# Patient Record
Sex: MALE | Race: AMERICAN INDIAN OR ALASKA NATIVE
[De-identification: names, ages, dates, MRNs, and addresses within clinical notes are randomized per-mention and may not be internally consistent; named-entity substitution may affect disease eponyms.]

---

## 2018-05-24 ENCOUNTER — HOSPITAL ENCOUNTER (EMERGENCY)
Dept: HOSPITAL 42 - ED | Age: 48
LOS: 1 days | Discharge: HOME | End: 2018-05-25
Payer: MEDICAID

## 2018-05-24 VITALS — RESPIRATION RATE: 18 BRPM

## 2018-05-24 VITALS — TEMPERATURE: 98.3 F

## 2018-05-24 DIAGNOSIS — F19.10: Primary | ICD-10-CM

## 2018-05-24 LAB
ALBUMIN SERPL-MCNC: 4.6 G/DL (ref 3–4.8)
ALBUMIN/GLOB SERPL: 1.3 {RATIO} (ref 1.1–1.8)
ALT SERPL-CCNC: 38 U/L (ref 7–56)
APAP SERPL-MCNC: < 10 UG/ML (ref 10–20)
APPEARANCE UR: CLEAR
AST SERPL-CCNC: 34 U/L (ref 17–59)
BASOPHILS # BLD AUTO: 0.05 K/MM3 (ref 0–2)
BASOPHILS NFR BLD: 0.7 % (ref 0–3)
BILIRUB UR-MCNC: NEGATIVE MG/DL
BUN SERPL-MCNC: 16 MG/DL (ref 7–21)
CALCIUM SERPL-MCNC: 9.5 MG/DL (ref 8.4–10.5)
COLOR UR: YELLOW
EOSINOPHIL # BLD: 0.1 10*3/UL (ref 0–0.7)
EOSINOPHIL NFR BLD: 1.7 % (ref 1.5–5)
ERYTHROCYTE [DISTWIDTH] IN BLOOD BY AUTOMATED COUNT: 13.3 % (ref 11.5–14.5)
GFR NON-AFRICAN AMERICAN: 54
GLUCOSE UR STRIP-MCNC: NEGATIVE MG/DL
GRANULOCYTES # BLD: 4.76 10*3/UL (ref 1.4–6.5)
GRANULOCYTES NFR BLD: 66.4 % (ref 50–68)
HGB BLD-MCNC: 12.6 G/DL (ref 14–18)
LEUKOCYTE ESTERASE UR-ACNC: NEGATIVE LEU/UL
LYMPHOCYTES # BLD: 1.6 10*3/UL (ref 1.2–3.4)
LYMPHOCYTES NFR BLD AUTO: 22.5 % (ref 22–35)
MCH RBC QN AUTO: 31.2 PG (ref 25–35)
MCHC RBC AUTO-ENTMCNC: 34 G/DL (ref 31–37)
MCV RBC AUTO: 91.8 FL (ref 80–105)
MONOCYTES # BLD AUTO: 0.6 10*3/UL (ref 0.1–0.6)
MONOCYTES NFR BLD: 8.7 % (ref 1–6)
PH UR STRIP: 6 [PH] (ref 4.7–8)
PLATELET # BLD: 319 10^3/UL (ref 120–450)
PMV BLD AUTO: 10.6 FL (ref 7–11)
PROT UR STRIP-MCNC: NEGATIVE MG/DL
RBC # BLD AUTO: 4.04 10^6/UL (ref 3.5–6.1)
RBC # UR STRIP: NEGATIVE /UL
SALICYLATES SERPL-MCNC: < 1 MG/DL (ref 2–20)
SP GR UR STRIP: <= 1.005 (ref 1–1.03)
UROBILINOGEN UR STRIP-ACNC: 0.2 E.U./DL
WBC # BLD AUTO: 7.2 10^3/UL (ref 4.5–11)

## 2018-05-24 NOTE — ED PDOC
Arrival/HPI





- General


Chief Complaint: Psychiatric Evaluation


Time Seen by Provider: 05/24/18 11:26


EM Caveat: Psychotic (history is diffucult to obtain secondary to flood of ideas

)





- History of Present Illness


Narrative History of Present Illness (Text): 


05/24/18 12:13


47 year old male brought in by EMS presents to the Emergency department because 

patient was acting strange in public, as per triage note. Patient admits to 

occasional heroin use. History is difficult to obtain secondary to patient's 

flood of ideas. Patient states he was with his parents but he is unable to 

provide a history for events prior to being picked up by EMS. Patient denies 

suicidal ideation, homicidal ideation, and hallucinations. Patient denies any 

fever, chills, chest pain, shortness of breath, nausea, vomiting, diarrhea, 

urinary symptoms, back pain, neck pain, headache, dizziness, trauma/injury, or 

any other complaints.


Time/Duration: Prior to Arrival


Symptom Onset: Sudden


Symptom Course: Unchanged


Activities at Onset: Light


Context: Walking





Past Medical History





- Provider Review


Nursing Documentation Reviewed: Yes





- Infectious Disease


Hx of Infectious Diseases: None





- Psychiatric


Hx Substance Use: No (unknown)





Family/Social History





- Physician Review


Nursing Documentation Reviewed: Yes


Family/Social History: Unknown Family HX


Smoking Status: Unknown If Ever Smoked


Hx Alcohol Use: No (unknown)


Hx Substance Use: No (unknown)





Allergies/Home Meds


Allergies/Adverse Reactions: 


Allergies





No Known Allergies Allergy (Unverified 05/24/18 12:02)


 








Home Medications: 


 Home Meds











 Medication  Instructions  Recorded  Confirmed


 


No Known Home Med  05/24/18 05/24/18














Review of Systems





- Review of Systems


Constitutional: absent: Fevers, Night Sweats


Respiratory: absent: SOB


Cardiovascular: absent: Chest Pain


Gastrointestinal: absent: Diarrhea, Nausea, Vomiting


Genitourinary Male: absent: Dysuria


Musculoskeletal: absent: Back Pain, Neck Pain


Neurological: absent: Headache, Dizziness


Psychiatric: absent: Suicidal Ideation





Physical Exam


Vital Signs Reviewed: Yes


Vital Signs











  Temp Pulse Resp BP Pulse Ox


 


 05/24/18 19:04   88  18  114/63  98


 


 05/24/18 12:13  98.3 F  92 H  17  141/89  99














- Systems Exam


Head: Present: Atraumatic, Normocephalic


Pupils: Present: PERRL


Extroacular Muscles: Present: EOMI


Conjunctiva: Present: Normal


Mouth: Present: Moist Mucous Membranes


Neck: Present: Normal Range of Motion


Respiratory/Chest: Present: Clear to Auscultation, Good Air Exchange.  No: 

Respiratory Distress, Accessory Muscle Use


Cardiovascular: Present: Regular Rate and Rhythm, Normal S1, S2.  No: Murmurs


Abdomen: No: Tenderness, Distention, Peritoneal Signs


Back: Present: Normal Inspection


Upper Extremity: Present: Normal Inspection.  No: Cyanosis, Edema


Lower Extremity: Present: Other (excoriations noted on lower extremities)


Neurological: Present: GCS=15, CN II-XII Intact, Speech Normal


Skin: Present: Warm, Dry, Normal Color.  No: Rashes


Psychiatric: Present: Alert, Oriented x 3, Other (flood of ideas, tangential 

thoughts)





Medical Decision Making


ED Course and Treatment: 


05/24/18 12:28


Impression:


47 year old male brought in to the Emergency department by EMS because he was 

"acting strange in public."





Plan:


-- EKG


-- Urinalysis


-- Labs


-- [facility] Crisis Evaluation


-- Reassess and disposition





Progress Notes:


05/24/18 13:06


Discussed case in detail with patient's PMD, Dr. Paulo Rocha. Patient has a 

history of behavioral issues, possible bipolar disorder. it is unknown if the 

patient is currently on any medications. Patient has been medically cleared for 

psychiatric evaluation. 





- Lab Interpretations


Lab Results: 








 05/24/18 12:15 





 05/24/18 12:15 





 Lab Results





05/24/18 12:45: Urine Opiates Screen Positive H, Urine Methadone Screen Negative

, Ur Barbiturates Screen Negative, Ur Phencyclidine Scrn Negative, Ur 

Amphetamines Screen Negative, U Benzodiazepines Scrn Negative, U Oth Cocaine 

Metabols Negative, U Cannabinoids Screen Negative


05/24/18 12:45: Urine Color Yellow, Urine Appearance Clear, Urine pH 6.0, Ur 

Specific Gravity <= 1.005, Urine Protein Negative, Urine Glucose (UA) Negative, 

Urine Ketones Negative, Urine Blood Negative, Urine Nitrate Negative, Urine 

Bilirubin Negative, Urine Urobilinogen 0.2, Ur Leukocyte Esterase Negative


05/24/18 12:15: Alcohol, Quantitative < 10


05/24/18 12:15: Salicylates < 1 L, Acetaminophen < 10.0 L


05/24/18 12:15: Sodium 141, Potassium 4.4, Chloride 102, Carbon Dioxide 28, 

Anion Gap 16, BUN 16, Creatinine 1.4, Est GFR (African Amer) > 60, Est GFR (Non-

Af Amer) 54, Random Glucose 106, Calcium 9.5, Total Bilirubin 0.4, AST 34, ALT 

38, Alkaline Phosphatase 45, Total Protein 8.1, Albumin 4.6, Globulin 3.6, 

Albumin/Globulin Ratio 1.3


05/24/18 12:15: WBC 7.2, RBC 4.04, Hgb 12.6 L, Hct 37.1 L, MCV 91.8, MCH 31.2, 

MCHC 34.0, RDW 13.3, Plt Count 319, MPV 10.6, Gran % 66.4, Lymph % (Auto) 22.5, 

Mono % (Auto) 8.7 H, Eos % (Auto) 1.7, Baso % (Auto) 0.7, Gran # 4.76, Lymph # (

Auto) 1.6, Mono # (Auto) 0.6, Eos # (Auto) 0.1, Baso # (Auto) 0.05











- RAD Interpretation


Narrative RAD Interpretations (Text): 


05/24/2018 12:22:51


PROCEDURE:  CHEST RADIOGRAPH, 1 VIEW


FINDINGS:


LUNGS:


Clear.


PLEURA:


No pneumothorax or pleural fluid seen.


CARDIOVASCULAR:


Normal.


OSSEOUS STRUCTURES:


No significant abnormalities.


VISUALIZED UPPER ABDOMEN:


Normal.


OTHER FINDINGS:


None. 


IMPRESSION:


No active disease.





Radiology Orders: 








05/24/18 12:02


CHEST ONE VIEW [RAD] Stat 














- EKG Interpretation


EKG Interpretation (Text): 


05/24/18 11:50


EKG:


Ordered, reviewed, and independently interpreted the EKG.


Rate : 68 BPM


Rhythm : NSR


Interpretation : No ST-segment elevations or depressions, no T-wave inversions, 

normal intervals.


Interpreted by ED Physician: Yes


Type: 12 lead EKG





- Medication Orders


Current Medication Orders: 











Discontinued Medications





Famotidine (Pepcid)  20 mg PO STAT STA


   Stop: 05/24/18 22:23


   Last Admin: 05/24/18 22:34  Dose: 20 mg





Lorazepam (Ativan)  2 mg IM STAT STA


   PRN Reason: Protocol


   Stop: 05/24/18 13:23


   Last Admin: 05/24/18 19:55 Dose:  Not Given


   Non-Admin Reason: Patient Refused





Nicotine (Nicoderm Cq)  1 patch TD STAT STA


   Stop: 05/24/18 19:30


   Last Admin: 05/24/18 19:30 Dose:  Not Given


   Non-Admin Reason: Patient Refused











- Transfer of Care


Patient signed out to Dr:: Patel


Other: [facility] screening





- Scribe Statement


The provider has reviewed the documentation as recorded by the Scribe


Xavier Vizcarra





All medical record entries made by the Scribe were at my direction and 

personally dictated by me. I have reviewed the chart and agree that the record 

accurately reflects my personal performance of the history, physical exam, 

medical decision making, and the department course for this patient. I have 

also personally directed, reviewed, and agree with the discharge instructions 

and disposition.





Disposition/Present on Arrival





- Present on Arrival


Any Indicators Present on Arrival: No


History of DVT/PE: No


History of Uncontrolled Diabetes: No


Urinary Catheter: No


History of Decub. Ulcer: No


History Surgical Site Infection Following: None





- Disposition


Have Diagnosis and Disposition been Completed?: No


Diagnosis: 


 Drug abuse





Disposition Time: 23:00


Condition: UNKNOWN


Forms:  [facility] (English)

## 2018-05-24 NOTE — ED PDOC
Physical Exam


Vital Signs Reviewed: Yes


Vital Signs











  Temp Pulse Resp BP Pulse Ox


 


 05/24/18 19:04   88  18  114/63  98


 


 05/24/18 12:13  98.3 F  92 H  17  141/89  99











Temperature: Afebrile


Blood Pressure: Normal


Pulse: Tachycardic


Respiratory Rate: Normal


Appearance: Positive for: Well-Appearing, Non-Toxic, Comfortable


Pain Distress: None


Mental Status: Positive for: Alert and Oriented X 3





Medical Decision Making


ED Course and Treatment: 





05/24/18 23:28: Case endorsed to me by Dr. Adkins. Patient pending [facility] [place] 

screening, reassessment, and disposition. 





05/25/18 01:34: Patient evaluated by [facility] screener. Patient is stable to be 

discharged home. 





- Lab Interpretations


Lab Results: 








 05/24/18 12:15 





 05/24/18 12:15 





 Lab Results





05/24/18 12:45: Urine Opiates Screen Positive H, Urine Methadone Screen Negative

, Ur Barbiturates Screen Negative, Ur Phencyclidine Scrn Negative, Ur 

Amphetamines Screen Negative, U Benzodiazepines Scrn Negative, U Oth Cocaine 

Metabols Negative, U Cannabinoids Screen Negative


05/24/18 12:45: Urine Color Yellow, Urine Appearance Clear, Urine pH 6.0, Ur 

Specific Gravity <= 1.005, Urine Protein Negative, Urine Glucose (UA) Negative, 

Urine Ketones Negative, Urine Blood Negative, Urine Nitrate Negative, Urine 

Bilirubin Negative, Urine Urobilinogen 0.2, Ur Leukocyte Esterase Negative


05/24/18 12:15: Alcohol, Quantitative < 10


05/24/18 12:15: Salicylates < 1 L, Acetaminophen < 10.0 L


05/24/18 12:15: Sodium 141, Potassium 4.4, Chloride 102, Carbon Dioxide 28, 

Anion Gap 16, BUN 16, Creatinine 1.4, Est GFR (African Amer) > 60, Est GFR (Non-

Af Amer) 54, Random Glucose 106, Calcium 9.5, Total Bilirubin 0.4, AST 34, ALT 

38, Alkaline Phosphatase 45, Total Protein 8.1, Albumin 4.6, Globulin 3.6, 

Albumin/Globulin Ratio 1.3


05/24/18 12:15: WBC 7.2, RBC 4.04, Hgb 12.6 L, Hct 37.1 L, MCV 91.8, MCH 31.2, 

MCHC 34.0, RDW 13.3, Plt Count 319, MPV 10.6, Gran % 66.4, Lymph % (Auto) 22.5, 

Mono % (Auto) 8.7 H, Eos % (Auto) 1.7, Baso % (Auto) 0.7, Gran # 4.76, Lymph # (

Auto) 1.6, Mono # (Auto) 0.6, Eos # (Auto) 0.1, Baso # (Auto) 0.05











- RAD Interpretation


Radiology Orders: 








05/24/18 12:02


CHEST ONE VIEW [RAD] Stat 














- Medication Orders


Current Medication Orders: 











Discontinued Medications





Famotidine (Pepcid)  20 mg PO STAT STA


   Stop: 05/24/18 22:23


   Last Admin: 05/24/18 22:34  Dose: 20 mg





Lorazepam (Ativan)  2 mg IM STAT STA


   PRN Reason: Protocol


   Stop: 05/24/18 13:23


   Last Admin: 05/24/18 19:55 Dose:  Not Given


   Non-Admin Reason: Patient Refused





Nicotine (Nicoderm Cq)  1 patch TD STAT STA


   Stop: 05/24/18 19:30


   Last Admin: 05/24/18 19:30 Dose:  Not Given


   Non-Admin Reason: Patient Refused











- Scribe Statement


The provider has reviewed the documentation as recorded by the Scribe


Zoë Partida 





Provider Scribe Attestation:


All medical record entries made by the Scribe were at my direction and 

personally dictated by me. I have reviewed the chart and agree that the record 

accurately reflects my personal performance of the history, physical exam, 

medical decision making, and the department course for this patient. I have 

also personally directed, reviewed, and agree with the discharge instructions 

and disposition.








Disposition/Present on Arrival





- Present on Arrival


Any Indicators Present on Arrival: No


History of DVT/PE: No


History of Uncontrolled Diabetes: No


Urinary Catheter: No


History of Decub. Ulcer: No


History Surgical Site Infection Following: None





- Disposition


Have Diagnosis and Disposition been Completed?: Yes


Diagnosis: 


 Drug abuse





Disposition: HOME/ ROUTINE


Disposition Time: 01:25


Condition: GOOD


Discharge Instructions (ExitCare):  Drug Abuse and Drug Addiction (DC)


Forms:  [facility] (English)

## 2018-05-24 NOTE — RAD
PROCEDURE:  CHEST RADIOGRAPH, 1 VIEW



HISTORY:

r/o infiltrate



COMPARISON:

None available.



FINDINGS:



LUNGS:

Clear.



PLEURA:

No pneumothorax or pleural fluid seen.



CARDIOVASCULAR:

Normal.



OSSEOUS STRUCTURES:

No significant abnormalities.



VISUALIZED UPPER ABDOMEN:

Normal.



OTHER FINDINGS:

None. 



IMPRESSION:

No active disease.

## 2018-05-24 NOTE — CARD
--------------- APPROVED REPORT --------------





EKG Measurement

Heart [id number]

PA [id number]

[id number]

[id number]

[id number]



<Conclusion>

Normal sinus rhythm

Possible Left atrial enlargement

Borderline ECG

## 2018-05-25 VITALS — DIASTOLIC BLOOD PRESSURE: 80 MMHG | OXYGEN SATURATION: 100 % | HEART RATE: 92 BPM | SYSTOLIC BLOOD PRESSURE: 130 MMHG

## 2018-07-17 ENCOUNTER — HOSPITAL ENCOUNTER (INPATIENT)
Dept: HOSPITAL 42 - ED | Age: 48
LOS: 2 days | Discharge: LEFT BEFORE DISCHARGE | DRG: 426 | End: 2018-07-19
Attending: PSYCHIATRY & NEUROLOGY | Admitting: PSYCHIATRY & NEUROLOGY
Payer: MEDICAID

## 2018-07-17 VITALS — OXYGEN SATURATION: 97 %

## 2018-07-17 VITALS — BODY MASS INDEX: 24.3 KG/M2

## 2018-07-17 DIAGNOSIS — F17.210: ICD-10-CM

## 2018-07-17 DIAGNOSIS — Z82.3: ICD-10-CM

## 2018-07-17 DIAGNOSIS — K21.9: ICD-10-CM

## 2018-07-17 DIAGNOSIS — B00.9: ICD-10-CM

## 2018-07-17 DIAGNOSIS — F32.9: Primary | ICD-10-CM

## 2018-07-17 DIAGNOSIS — R45.851: ICD-10-CM

## 2018-07-17 DIAGNOSIS — F11.10: ICD-10-CM

## 2018-07-17 NOTE — ED PDOC
Arrival/HPI





- General


Chief Complaint: Psychiatric Evaluation


Time Seen by Provider: 07/17/18 21:59


Historian: Patient





- History of Present Illness


Narrative History of Present Illness (Text): 





07/17/18 22:25


28-year-old male presents as psychiatric transfer for admission to behavioral 

health floor for depression. Patient states he was feeling depressed. Denies 

chest pain or shortness of breath. Denies abdominal pain. No nausea or vomiting.





Past Medical History





- Provider Review


Nursing Documentation Reviewed: Yes





- Travel History


Have you recently traveled outside [place] w/in the past 3 mons?: No





- Infectious Disease


Hx of Infectious Diseases: None





- Cardiac


Hx Cardiac Disorders: No


Hx Hypertension: No





- Pulmonary


Hx Tuberculosis: No





- Neurological


HX Cerebrovascular Accident: No


Hx Seizures: No





- Hematological/Oncological


Hx Cancer: No





- Gastrointestinal


Hx Gastroesophageal Reflux: Yes





- Genitourinary/Gynecological


Hx Sexually Transmitted Diseases: Yes (herpes)





- Psychiatric


Hx Depression: Yes


Hx Substance Use: Yes (heroin abuse)





- Anesthesia


Hx Anesthesia: No





Family/Social History





- Physician Review


Nursing Documentation Reviewed: Yes


Family/Social History: Unknown Family HX


Smoking Status: Unknown If Ever Smoked


Hx Alcohol Use: No (unknown)


Hx Substance Use: Yes (heroin abuse)





Allergies/Home Meds


Allergies/Adverse Reactions: 


Allergies





lactose Allergy (Verified 07/17/18 22:03)


 DIARRHEA








Home Medications: 


 Home Meds











 Medication  Instructions  Recorded  Confirmed


 


No Known Home Med  05/24/18 05/24/18














Review of Systems





- Review of Systems


Constitutional: absent: Fatigue, Fevers


Respiratory: absent: SOB, Cough


Cardiovascular: absent: Chest Pain, Palpitations


Gastrointestinal: absent: Abdominal Pain, Nausea, Vomiting


Genitourinary Male: absent: Dysuria


Musculoskeletal: absent: Arthralgias, Back Pain, Neck Pain


Neurological: absent: Headache, Dizziness


Endocrine: absent: Diaphoresis


Psychiatric: Depression.  absent: Anxiety





Physical Exam


Vital Signs Reviewed: Yes





Vital Signs











  Temp Pulse Resp BP Pulse Ox


 


 07/17/18 22:23  100.3 F H  64  18  150/90  97











Temperature: Afebrile


Blood Pressure: Normal


Pulse: Regular


Respiratory Rate: Normal


Appearance: Positive for: Well-Appearing, Non-Toxic, Comfortable


Pain Distress: None


Mental Status: Positive for: Alert and Oriented X 3





- Systems Exam


Head: Present: Atraumatic


Mouth: Present: Moist Mucous Membranes


Respiratory/Chest: Present: Clear to Auscultation


Cardiovascular: Present: Regular Rate and Rhythm


Abdomen: No: Tenderness





Medical Decision Making


ED Course and Treatment: 





07/17/18 22:26


48yr old male with depression. transferred to [facility] for admission for depression. 








pt was medically cleared prior to arrival. 





cbc; wbc; 4.7  hbg; 10.7


cmp; wnl


 uds; + cocaine and opiates














impression; depression


admit to behavioral health floor














Disposition/Present on Arrival





- Present on Arrival


Any Indicators Present on Arrival: No


History of DVT/PE: No


History of Uncontrolled Diabetes: No


Urinary Catheter: No


History of Decub. Ulcer: No


History Surgical Site Infection Following: None





- Disposition


Have Diagnosis and Disposition been Completed?: Yes


Diagnosis: 


 Depression





Disposition: HOSPITALIZED


Disposition Time: 22:27


Patient Plan: Admission


Condition: FAIR

## 2018-07-18 VITALS — RESPIRATION RATE: 20 BRPM

## 2018-07-18 LAB
HDLC SERPL-MCNC: 53 MG/DL (ref 29–60)
HEPATITIS A IGM: NEGATIVE
HEPATITIS B CORE AB: NEGATIVE
HEPATITIS C ANTIBODY: NEGATIVE
LDLC SERPL-MCNC: 82 MG/DL (ref 0–129)

## 2018-07-18 PROCEDURE — GZ3ZZZZ MEDICATION MANAGEMENT: ICD-10-PCS | Performed by: PSYCHIATRY & NEUROLOGY

## 2018-07-18 NOTE — RAD
Date of service: 



07/18/2018



HISTORY:

R/O pneumonia  



COMPARISON:

05/24/2018



TECHNIQUE:

Chest PA and lateral



FINDINGS:



LUNGS:

No active pulmonary disease.



PLEURA:

No significant pleural effusion identified. No pneumothorax apparent.



CARDIOVASCULAR:

Normal.



OSSEOUS STRUCTURES:

No significant abnormalities.



VISUALIZED UPPER ABDOMEN:

Normal.



OTHER FINDINGS:

None.



IMPRESSION:

No active disease.

## 2018-07-18 NOTE — PCM.PSYCH
<Stephanie Ross - Last Filed: 07/18/18 14:38>





Initial Psychiatric Evaluation





- Initial Psychiatric Evaluation


Type of Admission: Voluntary


Legal Status: Capacity


Chief Complaint (in patient's own words): 





I just keep getting frustrated with everything shahnaz on at home with my dad. I 

dont really care about myself. Im here to get better for them.





Patient's Reaction to Hospitalization: 





Patient was admitted to the psych unit for evaluation and stabilization after 

making suicidal statements to a friend.





History of Present Illness and Precipitating Events: 





Jerome Mendoza is a 49 yo  male with a hx of depression and 

heroin use who was sent as a transfer from [facility] [place] making [place] statements. 

The patient lives with his parents, who he takes care of. He reports becoming 

increasingly frustrated with his father because he as many needs after having a 

stroke last year. The patient expressed suicidal ideation to his friend who 

suggested he get help. The patient then brought himself to [facility] ED. According 

to medical records, in the ED, he endorsed SI and depression. He also admitted 

to snorting heroin 2 days prior. He states he brought himself to the hospital 

instead of attempting suicide because he cares about his parents and wants to 

get better for them.





The patient reports being depressed for awhile. He admits to one suicide 

attempt a long time ago by OD on pills. He reports being hospitalized for 

weeks after this attempt. He endorses low mood, poor sleep and appetite, and 

anhedonia. He appears to be future-oriented regarding caring for his parents; 

however, when asked what his future plans are, he replies, I cant even see 

tomorrow right now. Patient endorses irritability but denies other symptoms of 

ashley, including lack of need for sleep, grandiosity, pressured speech. He 

endorses anxiety regarding hi home life, but denies panic attacks or obsessions 

or compulsions. He denies paranoia, delusions, and hallucinations. He endorses 

heroin use intranasally. He denies alcohol or other drugs, including IVDA. 

Patient denies history of trauma or abuse.





Patient was previously hospitalized at [facility]. He also has been in rehab 

programs. The patient denies taking any psych meds now and does not see an 

outpatient psychiatrist. He states that the only time he took psych meds was 

while he was in [facility]. According to medical records, he was seen by PES at [facility] 

in April for bizarre behavior and heroin use- he was discharged from the ED. He 

was then seen at [facility] ED in 5/18 after being brought in by police fro bizarre 

behavior/agitation. Collateral was obtained from his mother at that time, and, 

according to PES worker, she stated that he makes suicidal statements when 

using drugs. 





The patient was seen this morning in tx team. He was dressed in casual clothing 

with acceptable hygiene. He was cooperative and calm with fair eye contact. He 

denied AVH, paranoia. He denied HI. Patient denied SI with a plan at this time 

and was able to contract for safety.  





Past psych hx:


[facility] inpatient


IOP 2014


Rehab for opioid abuse





PMH:


Active herpes infection- genital/anal, ?hands


?cardiomegaly





FH:


Father- CVA


Mother- poor physical health


No siblings or children


Denies h/o mental illness or suicide attempts





SH:


Lives with parents


Never , no children


Heroin intranasal- amount/frequency varies, last used 5 days ago


10 cigarettes/day


Denies alcohol use


Denies other illicit drug use, including IVDA


           


Current Medications: 





Active Medications











Generic Name Dose Route Start Last Admin





  Trade Name Freq  PRN Reason Stop Dose Admin


 


Acetaminophen  650 mg  07/17/18 22:55  





  Tylenol 325mg Tab  PO   





  Q4H PRN   





  Pain, Mild (1-3)   


 


Al Hydrox/Mg Hydrox/Simethicone  30 ml  07/17/18 22:55  





  Maalox Plus 30 Ml  PO   





  DAILY PRN   





  Upset Stomach   


 


Fluoxetine HCl  20 mg  07/18/18 08:00  07/18/18 08:01





  Prozac  PO   20 mg





  DAILY KUSUM   Administration


 


Hydroxyzine Pamoate  50 mg  07/18/18 00:58  





  Vistaril  PO   





  Q6 PRN   





  Anxiety   





  Protocol   


 


Lorazepam  2 mg  07/18/18 00:56  





  Ativan  PO   





  Q6H PRN   





  Agitation   





  Protocol   


 


Lorazepam  2 mg  07/18/18 00:57  





  Ativan  IM   





  Q6H PRN   





  Agitation   





  Protocol   


 


Magnesium Hydroxide  30 ml  07/17/18 22:55  





  Milk Of Magnesia  PO   





  DAILY PRN   





  Constipation   


 


Mirtazapine  15 mg  07/18/18 22:00  





  Remeron  PO   





  HS KUSUM   


 


Ondansetron HCl  4 mg  07/18/18 00:58  





  Zofran Odt  PO   





  Q8H PRN   





  Nausea/Vomiting   


 


Tramadol HCl  50 mg  07/18/18 00:58  





  Ultram  PO   





  Q8H PRN   





  Pain, moderate (4-7)   


 


Ziprasidone  20 mg  07/18/18 00:56  





  Geodon Cap  PO   





  Q6H PRN   





  Agitation   





  Protocol   


 


Ziprasidone  20 mg  07/18/18 00:57  





  Geodon Inj  IM   





  Q6H PRN   





  Agitation   





  Protocol   














Past Psychiatric History





- Past Psychiatric History


Pertinent Medical Hx (Current Medical&Sleep Prob, Allergies): 





 Allergies











Allergy/AdvReac Type Severity Reaction Status Date / Time


 


lactose Allergy  DIARRHEA Verified 07/18/18 01:07








 





Acyclovir/Hydrocortisone [Xerese 5%-1%] 1 cre TP BID 07/18/18 











<Theresa Sorenson - Last Filed: 07/18/18 15:18>





Initial Psychiatric Evaluation





- Initial Psychiatric Evaluation


Legal Status: Capacity (patient has capacity to sign consent for treatment)


History of Present Illness and Precipitating Events: 


patient was seen at the treatment team meeting, interviewed, discussed with 

staff


Notes reviewed and agreed with assessment and plan


Patient has acceptable personal hygiene, good ADLs.





Patient presented to be depressed, flat affect, very slow responses, yes no 

answers, patient Was making hopeless statements.





in regards of the past psychiatric history, patient reported that he overdosed 

on pills at the age of 19 and was in [facility] psychiatric 

inpatient unit for a couple of weeks, patient does not remember what 

medications he was taking, does not rememberif medications make him feel better.





patient was seen by infectious disease, patient is NOT required to be on 

contact isolation





Patient reported that he snorts opioids,last time was last Friday, patient 

reports smokes about 10 cigarettes a day, counseling provided, nicotine patch 

was offered, patient declined that offer


Current Medications: 





Active Medications











Generic Name Dose Route Start Last Admin





  Trade Name Freq  PRN Reason Stop Dose Admin


 


Acetaminophen  650 mg  07/17/18 22:55  07/18/18 14:16





  Tylenol 325mg Tab  PO   650 mg





  Q4H PRN   Administration





  Pain, Mild (1-3)   


 


Al Hydrox/Mg Hydrox/Simethicone  30 ml  07/17/18 22:55  





  Maalox Plus 30 Ml  PO   





  DAILY PRN   





  Upset Stomach   


 


Fluoxetine HCl  20 mg  07/18/18 08:00  07/18/18 08:01





  Prozac  PO   20 mg





  DAILY KUSUM   Administration


 


Hydroxyzine Pamoate  50 mg  07/18/18 00:58  





  Vistaril  PO   





  Q6 PRN   





  Anxiety   





  Protocol   


 


Lorazepam  2 mg  07/18/18 00:56  





  Ativan  PO   





  Q6H PRN   





  Agitation   





  Protocol   


 


Lorazepam  2 mg  07/18/18 00:57  





  Ativan  IM   





  Q6H PRN   





  Agitation   





  Protocol   


 


Magnesium Hydroxide  30 ml  07/17/18 22:55  





  Milk Of Magnesia  PO   





  DAILY PRN   





  Constipation   


 


Mirtazapine  15 mg  07/18/18 22:00  





  Remeron  PO   





  HS KUSUM   


 


Ondansetron HCl  4 mg  07/18/18 00:58  





  Zofran Odt  PO   





  Q8H PRN   





  Nausea/Vomiting   


 


Tramadol HCl  50 mg  07/18/18 00:58  





  Ultram  PO   





  Q8H PRN   





  Pain, moderate (4-7)   


 


Ziprasidone  20 mg  07/18/18 00:56  





  Geodon Cap  PO   





  Q6H PRN   





  Agitation   





  Protocol   


 


Ziprasidone  20 mg  07/18/18 00:57  





  Geodon Inj  IM   





  Q6H PRN   





  Agitation   





  Protocol   














Past Psychiatric History





- Past Psychiatric History


Previous Treatment History: Inpatient


Prior Professional Help: See HPI


Prior Psychiatric Treatment: see HPI


At what hospital: see HPI


Duration: see HPI


Nature of Treatment: see HPI


Explanation of prior treatment: 





see HPI


History of Abuse: 





see HPI


History of ETOH/Drug Use: 





see HPI


History of Family Illness: 





see HPI


Pertinent Medical Hx (Current Medical&Sleep Prob, Allergies): 





 Allergies











Allergy/AdvReac Type Severity Reaction Status Date / Time


 


lactose Allergy  DIARRHEA Verified 07/18/18 01:07








 





Acyclovir/Hydrocortisone [Xerese 5%-1%] 1 cre TP BID 07/18/18 











Review of Systems





- Review of Systems


Systems not reviewed;Unavailable: Acuity of Condition





- EENT


Eyes: As Per HPI


Ears: As Per HPI


Nose/Mouth/Throat: As Per HPI





- Cardiovascular


Cardiovascular: As Per HPI





- Respiratory


Respiratory: As Per HPI





- Gastrointestinal


Gastrointestinal: As Per HPI





- Genitourinary


Genitourinary: As Per HPI





- Reproductive: Male


Reproductive:Male: As Per HPI





- Musculoskeletal


Musculoskeletal: As Par HPI





- Integumentary


Integumentary: As Per HPI





- Neurological


Neurological: As Per HPI





- Psychiatric


Psychiatric: As Per HPI





- Endocrine


Endocrine: As Per HPI





- Hematologic/Lymphatic


Hematologic: As Per HPI





Mental Status Examination





- Personal Presentation


Personal Presentation: Looks older than stated age





- Affect


Affect: Flat





- Motor Activity


Motor Activity: Psychomotor Retardation





- Reliability in Providing Information


Reliability in Providing Information: Fair





- Speech


Speech: Organized





- Mood


Mood: Depressed





- Formal Thought Process


Formal Thought Process: No Impairment, Paranoia





- Hallucinations/Delusions


Delusions: Persecution





- Obsessions/Compulsions


Obsessions: None


Compulsions: None





- Cognitive Functions


Orientation: Person, Place, Situation


Sensorium: Alert


Abstract Thinking: [place]


Estimate of Intelligence: Below average


Judgement: Intact, as evidence by: Insight regarding need for hospitalization





- Risk


Risk: Diminished functioning





- Strength & Assets Inventory


Strength & Assets Inventory: Cooperative





- Limitations


Limitations: Other (severe and as of the symptoms)





DSM 5 DX





- DSM 5


DSM 5 Diagnosis: 





rule out major depressive disorder


Rule out opioid abuse


Rule out substance-induced mood disorder





- Recommended/Plan of Treatment


Treatment Recommendations and Plan of Treatment: 


Milieu/structure/supportive therapy 


Medical consult appreciated, see medical team note for more detailed info 


SW consultation for discharge plan and social issues 


Med management 


Prozac 20 mg daily for depression and anxiety


Remeron 15 mg at the nighttime for insomnia


Symptomatic treatment for possible withdrawal symptoms but patient reported 

that he started opioids last Friday at present moment patient denied any 

withdrawal symptoms


Family involvement 


Follow up on labs 


Will monitor closely 


Pt was educated about risk/benefits and alternatives of medications, coping 

strategies (safety plan, suicide prevention), relapse prevention, importance of 

follow up with psychiatrist and therapist, stay away from drugs/alcohol/smoking





Projected ELOS: 7 days


Prognosis: guarded


Discharge Plan and Discharge Criteria: 


Pt will be not depressed or manic, will be more hopeful, will be not psychotic 

or anxious, will be not having thoughts of harming self or others, will be 

tolerating medications well, will not have major side effects, will be able to 

function, will not pose threat to self or others.





- Smoking Cessation


Smoking Cessation Initiated: No


Reason for not providing: refused

## 2018-07-18 NOTE — CP.PCM.CON
<Pablo Mcgrath - Last Filed: 07/18/18 15:01>





History of Present Illness





- History of Present Illness


History of Present Illness: 





Reason for consult: Open wound on left foot





Mr. Mendoza is a 47 yo male with a PMH of herpes on valcyclovir and doxycycline 

presents as psychiatric transfer from [facility] for admission to behavioral health 

for depression and suicidal ideation. Medicine was consulted to evaluate an 

open wound on his left foot. Patient states that he has had the wound for 3 

weeks. It started off as a blister and it became worse overtime. He states that 

the pain is worse when he is walking and putting pressure on the wound. Patient 

otherwise denies fever, chills, headaches, nausea, vomiting, chest pain, SOB, 

or abdominal pain.





12 point review of systems negative.





Past medical history: Herpes 


Medications: Acyclovir and doxycycline, not on any other home medications


Surgical history: denies


Allergies: lactose intolerance


Social history: Smoker- 20 pack years, Heroin- snort, last use was this week, 

denies ealcohol use


Family history: Father had a history of CVA, mother had brain cancer.





Review of Systems





- Review of Systems


Review of Systems: 





12 point review of systems negative.





Past Patient History





- Infectious Disease


Hx of Infectious Diseases: None





- Past Social History


Smoking Status: Unknown If Ever Smoked





- CARDIAC


Hx Cardiac Disorders: No


Hx Hypertension: No





- PULMONARY


Hx Tuberculosis: No





- NEUROLOGICAL


HX Cerebrovascular Accident: No


Hx Seizures: No





- HEENT


Hx HEENT Problems: No





- RENAL


Hx Chronic Kidney Disease: No





- ENDOCRINE/METABOLIC


Hx Endocrine Disorders: No





- HEMATOLOGICAL/ONCOLOGICAL


Hx Cancer: No





- INTEGUMENTARY


Hx Dermatological Problems: Yes


Other/Comment: recent Herpes outbreak





- MUSCULOSKELETAL/RHEUMATOLOGICAL


Hx Musculoskeletal Disorders: No





- GASTROINTESTINAL


Hx Gastroesophageal Reflux: Yes





- GENITOURINARY/GYNECOLOGICAL


Hx Sexually Transmitted Disorders: Yes (herpes)





- PSYCHIATRIC


Hx Depression: Yes


Hx Substance Use: Yes (heroin abuse)





- SURGICAL HISTORY


Hx Surgeries: No





- ANESTHESIA


Hx Anesthesia: No





Meds


Allergies/Adverse Reactions: 


 Allergies











Allergy/AdvReac Type Severity Reaction Status Date / Time


 


lactose Allergy  DIARRHEA Verified 07/18/18 01:07














- Medications


Medications: 


 Current Medications





Acetaminophen (Tylenol 325mg Tab)  650 mg PO Q4H PRN


   PRN Reason: Pain, Mild (1-3)


Al Hydrox/Mg Hydrox/Simethicone (Maalox Plus 30 Ml)  30 ml PO DAILY PRN


   PRN Reason: Upset Stomach


Fluoxetine HCl (Prozac)  20 mg PO DAILY [facility]


   Last Admin: 07/18/18 08:01 Dose:  20 mg


Hydroxyzine Pamoate (Vistaril)  50 mg PO Q6 PRN; Protocol


   PRN Reason: Anxiety


Lorazepam (Ativan)  2 mg PO Q6H PRN; Protocol


   PRN Reason: Agitation


Lorazepam (Ativan)  2 mg IM Q6H PRN; Protocol


   PRN Reason: Agitation


Magnesium Hydroxide (Milk Of Magnesia)  30 ml PO DAILY PRN


   PRN Reason: Constipation


Mirtazapine (Remeron)  15 mg PO HS KUSUM


Ondansetron HCl (Zofran Odt)  4 mg PO Q8H PRN


   PRN Reason: Nausea/Vomiting


Tramadol HCl (Ultram)  50 mg PO Q8H PRN


   PRN Reason: Pain, moderate (4-7)


Ziprasidone (Geodon Cap)  20 mg PO Q6H PRN; Protocol


   PRN Reason: Agitation


Ziprasidone (Geodon Inj)  20 mg IM Q6H PRN; Protocol


   PRN Reason: Agitation











Physical Exam





- Head Exam


Head Exam: ATRAUMATIC, NORMAL INSPECTION





- Eye Exam


Eye Exam: Normal appearance





- ENT Exam


ENT Exam: Mucous Membranes Moist





- Cardiovascular Exam


Cardiovascular Exam: REGULAR RHYTHM.  absent: Gallop, Rubs, +S1, +S2, Systolic 

Murmur





- GI/Abdominal Exam


GI & Abdominal Exam: Normal Bowel Sounds, Soft.  absent: Tenderness





- Extremities Exam


Extremities exam: Negative for: calf tenderness, pedal edema





- Neurological Exam


Neurological exam: Alert, Normal Gait, Oriented x3





- Psychiatric Exam


Psychiatric exam: Flat Affect





- Skin


Skin Exam: Dry, Normal Color, Warm


Additional comments: 





Open sore that looks superficial on the plantar side below the first metatarsal 

measuring 3-4 cm. No active bleeding and not purulent.


Dark, flat, macular lesions found along legs and arms.





Results





- Vital Signs


Recent Vital Signs: 


 Last Vital Signs











Temp  98.2 F   07/18/18 07:00


 


Pulse  55 L  07/18/18 07:00


 


Resp  20   07/18/18 07:00


 


BP  128/90   07/18/18 07:00


 


Pulse Ox  97   07/17/18 22:53














- Labs


Labs: 


 Laboratory Results - last 24 hr











  07/18/18 07/18/18 07/18/18





  06:39 07:30 07:30


 


Fasting Glucose   88 


 


Triglycerides   72 


 


Cholesterol   170 


 


LDL Cholesterol Direct   82 


 


HDL Cholesterol   53 


 


Procalcitonin  < 0.05 L  


 


TSH 3rd Generation    1.60














Assessment & Plan





- Assessment and Plan (Free Text)


Assessment: 





Mr. Mendoza is a 47 yo male with PMH of herpes that is admitted to behavioral 

unit for depression and suicidal ideation. Medicine was consulted to evaluate 

an open wound on the plantar surface of the left foot below the 1st metatarsal.


Plan: 








Open wound on left foot


- need to rule out infectious etiologies vs diabetic ulcer


- ID consulted, will monitor without antibiotics for now


- procalcitonin <0.05


- wound culture pending 


- Hemoglobin a1c pending


- HIV test pending


- Hepatitis panel pending


- UA pending





Suicidal Ideation and depression


- Patient is currently placed on voluntary hold in-patient psych 


- Management per psych








Patient case reviewed with and plan approved by attending physician, Dr. Tabares.


Thank you for allowing me to participate in the care of the above named 

patient. 





<Lynnette Tabares R - Last Filed: 07/18/18 16:53>





Meds





- Medications


Medications: 


 Current Medications





Acetaminophen (Tylenol 325mg Tab)  650 mg PO Q4H PRN


   PRN Reason: Pain, Mild (1-3)


   Last Admin: 07/18/18 14:16 Dose:  650 mg


Al Hydrox/Mg Hydrox/Simethicone (Maalox Plus 30 Ml)  30 ml PO DAILY PRN


   PRN Reason: Upset Stomach


Fluoxetine HCl (Prozac)  20 mg PO DAILY KUSUM


   Last Admin: 07/18/18 08:01 Dose:  20 mg


Hydroxyzine Pamoate (Vistaril)  50 mg PO Q6 PRN; Protocol


   PRN Reason: Anxiety


Lorazepam (Ativan)  2 mg PO Q6H PRN; Protocol


   PRN Reason: Agitation


Lorazepam (Ativan)  2 mg IM Q6H PRN; Protocol


   PRN Reason: Agitation


Magnesium Hydroxide (Milk Of Magnesia)  30 ml PO DAILY PRN


   PRN Reason: Constipation


Mirtazapine (Remeron)  15 mg PO HS KUSUM


Ondansetron HCl (Zofran Odt)  4 mg PO Q8H PRN


   PRN Reason: Nausea/Vomiting


Tramadol HCl (Ultram)  50 mg PO Q8H PRN


   PRN Reason: Pain, moderate (4-7)


Ziprasidone (Geodon Cap)  20 mg PO Q6H PRN; Protocol


   PRN Reason: Agitation


Ziprasidone (Geodon Inj)  20 mg IM Q6H PRN; Protocol


   PRN Reason: Agitation











Results





- Vital Signs


Recent Vital Signs: 


 Last Vital Signs











Temp  98.2 F   07/18/18 07:00


 


Pulse  55 L  07/18/18 07:00


 


Resp  20   07/18/18 07:00


 


BP  128/90   07/18/18 07:00


 


Pulse Ox  97   07/17/18 22:53














- Labs


Labs: 


 Laboratory Results - last 24 hr











  07/18/18 07/18/18 07/18/18





  06:39 07:30 07:30


 


Fasting Glucose   88 


 


Triglycerides   72 


 


Cholesterol   170 


 


LDL Cholesterol Direct   82 


 


HDL Cholesterol   53 


 


Procalcitonin  < 0.05 L  


 


TSH 3rd Generation    1.60














Attending/Attestation





- Attestation


I have personally seen and examined this patient.: Yes


I have fully participated in the care of the patient.: Yes


I have reviewed all pertinent clinical information: Yes


Notes (Text): 


Patient seen and examined by me at 12:30PM with resident.  Case including HPI, 

physical exam, and physical assessment and plan discussed with resident. Agree 

with above with following additions/corrections.


Patient is a 48-year-old male with past medical history significant for herpes 

that was admitted to psychiatric unit for making suicidal comments to his 

friend. We are consulted for low-grade fever and small open wound on plantar 

surface of left foot.


Patient states that the open wound on the plantar side of his left foot has 

been there for approximately 3 weeks. He states that he gets wounds like this 

often. States that they started off like a blister and they may turn into 

wounds and take a long time to heal. Patient has not tried anything for this 

wound. Patient was transfered from [facility]. Patient states his wound was wrapped 

at [facility]. Patient states that he did see his primary care doctor as an 

outpatient for this. Patient states it is painful and is worse with walking. No 

radiation of the pain.  Patient denies any fevers. However patient was found to 

have low grade temp of 100.3. No chest pain or shortness of breath. No nausea, 

vomiting, or abdominal pain. No headaches or dizziness. No dysuria. No neck 

pain or back pain.


14 point review of systems reviewed by me. See above HPI. All other review of 

systems negative.


Physical exam:


Gen: Awake and alert sitting up in bed in no acute distress


HEENT: Normocephalic atraumatic. Extraocular muscles intact, pupils equal 

reactive. No scleral icterus. Oropharynx is pink and moist, no pharyngeal 

erythema or exudate appreciated. Neck is supple. Hearing grossly intact. Ears 

and nose externally unremarkable.


Cardiovascular: Normal rhythm, normal S1-S2. No murmurs, rubs, or gallops 

appreciated 


Pulmonary: Normal respiratory effort. No rhonchi, rales or wheezing 

appreciated. 


Gastrointestinal: Soft, nontender, nondistended, positive bowel sounds all 4 

quadrants, no guarding


Musculoskeletal: Normal range of motion all extremities, no calf tenderness, no 

CVA tenderness


Central nervous system: AAO 3. Cranial nerves 2 through 12 grossly intact. 5 

out of 5 muscle strength all extremities. Sensation intact.


Dermatologic: Skin warm and dry. Positive healed dark scar around wounds on 

Lasix and arms. Positive proximately 2 cm superficial open wound on plantar 

surface, no drainage from the area, no erythema, no signs of infection


Assessment and plan: Patient is a 48-year-old male with past medical history 

significant for herpes that was admitted to psychiatric unit for making 

suicidal comments to his friend. We are consulted for low-grade fever and small 

open wound on plantar surface of left foot.


1. Left foot wound. Low-grade temperature. ID following, recommendations noted. 

Patient to be observed off antibiotics for now. Rule out other sources of 

infection. Rule out UTI. Follow up urinalysis, urine culture, and labs. Chest x-

ray with no active disease


2. Suicidal ideation and depression. Care as per primary team.


3. History of herpes. No active outbreak currently.


Case was discussed in detail with the patient, psychiatrist, and medical 

resident regarding current diagnosis and treatment plan.


Thank you for allowing us to participate in the care of your patient. We will 

follow with you.

## 2018-07-18 NOTE — PCM.BM
Treatment Plan Problems





- Problems identified on initial assessmt


  ** SUICIDAL IDEATION


Date Initiated: 07/17/18


Time Initiated: 22:00


Assessment reference: NA


Status: Active


Priority: 1





  ** INEFFECTIVE COPING


Date Initiated: 07/17/18


Time Initiated: 22:00


Assessment reference: NA


Status: Active


Priority: 2





  ** HOPELESSNESS/HELPLESSNESS


Date Initiated: 07/17/18


Time Initiated: 22:00


Assessment reference: NA


Status: Active


Priority: 3





  ** ALTERED SLEEPING PATTERN


Date Initiated: 07/17/18


Time Initiated: 22:00


Assessment reference: NA


Priority: 4





Treatment assets and liabiliti


Patient Assests: adapts well, cooperative, educated, insightful, self-reliant, 

ADL independent, physically healthy, negotiates basic needs, cognitively intact


Patient Liabilities: physical pain, financial problems, substance abuse





- Milieu Protocol


Maintain good personal hygiene: every other day Encourage regular showers, 

every shift Remind patient to perform daily oral care, every shift Assist 

patient to perform ADL's


Maintain personal safety: every shift Educate patient to report safety concerns 

to staff, every shift Monitor environment for contraband/sharps


Medication safety: Monitor for expected outcome, potential side effects: every 

shift, Assess barriers to learning: every shift, Assess readiness for 

medication education: every shift





Family Contact


Family involvement: Family/SO is involved


Family contact: Patient agrees to contact





Discharge/Continuing Care





- Education Needs


Education Needs: Patient Medication, Patient Diagnosis/Disease Process, Patient 

Coping Skills, Patient Pain





- Discharge


Discharge Criteria: Tolerates medication w/o severe side effects, Free of 

Suicidal thoughts, Free of agitation, Normal sleep pattern

## 2018-07-18 NOTE — CP.PCM.CON
History of Present Illness





- History of Present Illness


History of Present Illness: 


48 year old male with PMH of GERD, history of HSV infection, history of heroin 

abuse came in to [facility] and is now admitted in the Psych unit for depression. The 

patient was noted to have low grade temperatures on admission and Infectious 

Diseases consult is requested to further evaluate and manage.The patient 

currently dose not have fever or chills, no cough or rhinorrhea, no sore throat

, no abdominal pain, no dysuria, no penile discharge, no abdominal pain, no 

diarrhea, no SOB.





Review of Systems





- Review of Systems


All systems: reviewed and no additional remarkable complaints except (as per HPI

)





Past Patient History





- Infectious Disease


Hx of Infectious Diseases: None





- Past Social History


Smoking Status: Unknown If Ever Smoked





- CARDIAC


Hx Cardiac Disorders: No


Hx Hypertension: No





- PULMONARY


Hx Tuberculosis: No





- NEUROLOGICAL


HX Cerebrovascular Accident: No


Hx Seizures: No





- HEENT


Hx HEENT Problems: No





- RENAL


Hx Chronic Kidney Disease: No





- ENDOCRINE/METABOLIC


Hx Endocrine Disorders: No





- HEMATOLOGICAL/ONCOLOGICAL


Hx Cancer: No





- INTEGUMENTARY


Hx Dermatological Problems: Yes


Other/Comment: recent Herpes outbreak





- MUSCULOSKELETAL/RHEUMATOLOGICAL


Hx Musculoskeletal Disorders: No





- GASTROINTESTINAL


Hx Gastroesophageal Reflux: Yes





- GENITOURINARY/GYNECOLOGICAL


Hx Sexually Transmitted Disorders: Yes (herpes)





- PSYCHIATRIC


Hx Depression: Yes


Hx Substance Use: Yes (heroin abuse)





- SURGICAL HISTORY


Hx Surgeries: No





- ANESTHESIA


Hx Anesthesia: No





Meds


Allergies/Adverse Reactions: 


 Allergies











Allergy/AdvReac Type Severity Reaction Status Date / Time


 


lactose Allergy  DIARRHEA Verified 07/18/18 01:07














- Medications


Medications: 


 Current Medications





Acetaminophen (Tylenol 325mg Tab)  650 mg PO Q4H PRN


   PRN Reason: Pain, Mild (1-3)


Al Hydrox/Mg Hydrox/Simethicone (Maalox Plus 30 Ml)  30 ml PO DAILY PRN


   PRN Reason: Upset Stomach


Fluoxetine HCl (Prozac)  20 mg PO DAILY KUSUM


Hydroxyzine Pamoate (Vistaril)  50 mg PO Q6 PRN; Protocol


   PRN Reason: Anxiety


Lorazepam (Ativan)  2 mg PO Q6H PRN; Protocol


   PRN Reason: Agitation


Lorazepam (Ativan)  2 mg IM Q6H PRN; Protocol


   PRN Reason: Agitation


Magnesium Hydroxide (Milk Of Magnesia)  30 ml PO DAILY PRN


   PRN Reason: Constipation


Ondansetron HCl (Zofran Odt)  4 mg PO Q8H PRN


   PRN Reason: Nausea/Vomiting


Tramadol HCl (Ultram)  50 mg PO Q8H PRN


   PRN Reason: Pain, moderate (4-7)


Ziprasidone (Geodon Cap)  20 mg PO Q6H PRN; Protocol


   PRN Reason: Agitation


Ziprasidone (Geodon Inj)  20 mg IM Q6H PRN; Protocol


   PRN Reason: Agitation











Physical Exam





- Constitutional


Appears: Chronically Ill





- Head Exam


Head Exam: NORMAL INSPECTION





- Respiratory Exam


Respiratory Exam: Decreased Breath Sounds





- Cardiovascular Exam


Cardiovascular Exam: +S1, +S2





- GI/Abdominal Exam


GI & Abdominal Exam: Soft.  absent: Tenderness





Results





- Vital Signs


Recent Vital Signs: 


 Last Vital Signs











Temp  100.3 F H  07/17/18 22:53


 


Pulse  64   07/17/18 22:53


 


Resp  18   07/17/18 22:53


 


BP  150/90   07/17/18 22:53


 


Pulse Ox  97   07/17/18 22:53














Assessment & Plan





- Assessment and Plan (Free Text)


Plan: 





Assessment


Low grade fever R/O UTI, R/O upper respiratory infection


depression


GERD


history of HSV infection


history of heroin abuse





Plan


will monitor off antibiotics - follow up blood cx, urine cx, urinalysis, CBC, 

PCT, CXR


discussed with Dr. Cardenas

## 2018-07-19 VITALS — HEART RATE: 57 BPM | DIASTOLIC BLOOD PRESSURE: 92 MMHG | TEMPERATURE: 97.8 F | SYSTOLIC BLOOD PRESSURE: 124 MMHG

## 2018-07-19 NOTE — CP.PCM.PN
Subjective





- Date & Time of Evaluation


Date of Evaluation: 07/19/18


Time of Evaluation: 11:30





- Subjective


Subjective: 





Comfortable, no fevers, no dysuria, no nausea, no abdominal pain, no cough.





Objective





- Vital Signs/Intake and Output


Vital Signs (last 24 hours): 


 











Temp Pulse Resp BP Pulse Ox


 


 97.8 F   57 L  20   124/92 H  97 


 


 07/19/18 07:01  07/19/18 07:01  07/19/18 07:01  07/19/18 07:01  07/17/18 22:53











- Medications


Medications: 


 Current Medications





Acetaminophen (Tylenol 325mg Tab)  650 mg PO Q4H PRN


   PRN Reason: Pain, Mild (1-3)


   Last Admin: 07/18/18 21:04 Dose:  650 mg


Al Hydrox/Mg Hydrox/Simethicone (Maalox Plus 30 Ml)  30 ml PO DAILY PRN


   PRN Reason: Upset Stomach


Fluoxetine HCl (Prozac)  20 mg PO DAILY [facility]


   Last Admin: 07/18/18 08:01 Dose:  20 mg


Hydroxyzine Pamoate (Vistaril)  50 mg PO Q6 PRN; Protocol


   PRN Reason: Anxiety


Lorazepam (Ativan)  2 mg PO Q6H PRN; Protocol


   PRN Reason: Agitation


Lorazepam (Ativan)  2 mg IM Q6H PRN; Protocol


   PRN Reason: Agitation


Magnesium Hydroxide (Milk Of Magnesia)  30 ml PO DAILY PRN


   PRN Reason: Constipation


Mirtazapine (Remeron)  15 mg PO [facility]


   Last Admin: 07/18/18 21:05 Dose:  15 mg


Ondansetron HCl (Zofran Odt)  4 mg PO Q8H PRN


   PRN Reason: Nausea/Vomiting


Tramadol HCl (Ultram)  50 mg PO Q8H PRN


   PRN Reason: Pain, moderate (4-7)


   Last Admin: 07/18/18 23:40 Dose:  50 mg


Ziprasidone (Geodon Cap)  20 mg PO Q6H PRN; Protocol


   PRN Reason: Agitation


Ziprasidone (Geodon Inj)  20 mg IM Q6H PRN; Protocol


   PRN Reason: Agitation











- Constitutional


Appears: Non-toxic, Chronically Ill





- Head Exam


Head Exam: NORMAL INSPECTION





- Cardiovascular Exam


Cardiovascular Exam: +S1, +S2





- GI/Abdominal Exam


GI & Abdominal Exam: Soft.  absent: Tenderness





Assessment and Plan





- Assessment and Plan (Free Text)


Plan: 





Assessment


Low grade fever R/O upper respiratory infection (viral), with symptoms resolved


depression


GERD


history of HSV infection


history of heroin abuse





Plan


will continue to monitor off antibiotics - cultures are negative, PCT is <0.05


discussed with Dr. Cardenas

## 2018-07-19 NOTE — CP.PCM.PN
<Bowen Tabares - Last Filed: 07/19/18 16:57>





Subjective





- Date & Time of Evaluation


Date of Evaluation: 07/19/18


Time of Evaluation: 12:00





- Subjective


Subjective: 





Subjective:


Patient seen and examined at bedside. Resting comfortably in bed. No acute 

overnight events. Patient states left foot pain has improved relative to 

baseline. Offers no new complaints at this time. Denies fever, chills, chest 

pain, shortness of breath, abdominal pain, nausea, vomiting, diarrhea, 

constipation, and urinary symptoms.





12-point review of systems negative except as indicated in the HPI





Physical Examination:


- Head Exam


Head Exam: ATRAUMATIC, NORMAL INSPECTION





- Eye Exam


Eye Exam: Normal appearance





- ENT Exam


ENT Exam: Mucous Membranes Moist





- Cardiovascular Exam


Cardiovascular Exam: REGULAR RHYTHM. bradycardic, absent: Gallop, Rubs, +S1, +S2

, Systolic Murmur





- GI/Abdominal Exam


GI & Abdominal Exam: Normal Bowel Sounds, Soft.  absent: Tenderness





- Extremities Exam


Extremities exam: Negative for: calf tenderness, pedal edema





- Neurological Exam


Neurological exam: Alert, Oriented x3





- Psychiatric Exam


Psychiatric exam: Flat Affect





- Skin


Skin Exam: Dry, warm, wound of left foot  superficial on the plantar side below 

the first metatarsal measuring 3-4 cm. No active bleeding and not purulent. Dark

, flat, macular lesions found along legs and arms.








Assessment and Plan:


Patient is a 48 year old male with PMHx of HSV infection, GERD, and heroin 

abuse who was admitted to behavioral unit for depression and suicidal ideation. 

Medicine was consulted to evaluate an open wound on the plantar surface of the 

left foot below the 1st metatarsal.





Open wound on left foot


- ID consulted, will monitor without antibiotics for now


- procalcitonin <0.05, no leukocytosis, no fever


- blood culture- no growth after 24 hours


- wound culture pending 


- Hemoglobin a1c 5.1


- HIV test negative


- Hepatitis panel negative


- RPR negative


-  UA- no signs of UTI


- wound care consulted





Suicidal Ideation and depression


- Management per psych





Thank you for the opportunity to participate in the care of this patient. 

Medicine team will sign off at this time. Please reconsult if needed. 





Patient seen with, case reviewed by, and plan approved by attending physician, 

Dr. Tabares.

















Objective





- Vital Signs/Intake and Output


Vital Signs (last 24 hours): 


 











Temp Pulse Resp BP Pulse Ox


 


 97.8 F   57 L  20   124/92 H  97 


 


 07/19/18 07:01  07/19/18 07:01  07/19/18 07:01  07/19/18 07:01  07/17/18 22:53











<Lynnette Tabares R - Last Filed: 07/19/18 17:55>





Objective





- Vital Signs/Intake and Output


Vital Signs (last 24 hours): 


 











Temp Pulse Resp BP Pulse Ox


 


 97.8 F   57 L  20   124/92 H  97 


 


 07/19/18 07:01  07/19/18 07:01  07/19/18 07:01  07/19/18 07:01  07/17/18 22:53











Attending/Attestation





- Attestation


I have personally seen and examined this patient.: Yes


I have fully participated in the care of the patient.: Yes


I have reviewed all pertinent clinical information, including history, physical 

exam and plan: Yes


Notes (Text): 


Patient seen and examined by me at 12:00PM with resident.  Case including HPI, 

physical exam, and physical assessment and plan discussed with resident. Agree 

with above with following additions/corrections.


Patient states he is feeling ok. Still complains of pain at his left foot wound 

site. No fevers or chills.  No chest pain or shortness of breath. No nausea, 

vomiting, or abdominal pain. No headaches or dizziness. No dysuria. No neck 

pain or back pain.


Physical exam:


Gen: Awake and alert lying in bed in no acute distress


HEENT: Normocephalic atraumatic. Extraocular muscles intact, pupils equal 

reactive. No scleral icterus. Oropharynx is pink and moist, no pharyngeal 

erythema or exudate appreciated. Neck is supple.


Cardiovascular: Normal rhythm, normal S1-S2. No murmurs, rubs, or gallops 

appreciated 


Pulmonary: Normal respiratory effort. No rhonchi, rales or wheezing 

appreciated. 


Gastrointestinal: Soft, nontender, nondistended, positive bowel sounds all 4 

quadrants, no guarding


Musculoskeletal: Normal range of motion all extremities, no calf tenderness, no 

CVA tenderness


Central nervous system: AAO 3. 


Dermatologic: Skin warm and dry. Positive healed dark scars around wounds on 

legs and arms. Positive proximately 2 cm  healing superficial open wound on 

plantar surface, no drainage from the area, no erythema, no signs of infection


Assessment and plan: Patient is a 48-year-old male with past medical history 

significant for herpes that was admitted to psychiatric unit for making 

suicidal comments to his friend. We are consulted for low-grade fever and small 

open wound on plantar surface of left foot.


1. Left foot wound. Low-grade temperature. ID following, recommendations noted. 

Patient to be observed off antibiotics. Patient afebrile. Chest x-ray with no 

active disease. No labs available. NO urinalysis available. 


2. Suicidal ideation and depression. Care as per primary team.


3. History of herpes. No active outbreak currently.


Case was discussed in detail with the patient  and medical resident regarding 

current diagnosis and treatment plan.


Patient is doing better. We will sign off. Please reconsult if needed.

## 2018-07-19 NOTE — PCM.PYCHDC
Discharge Summary





- Discharge Note


Psychiatric History (includes Medical, Family, Personal Hx): see HPI


Laboratory Data: 





 Abnormal Lab Results











  07/18/18 07/18/18 07/18/18





  07:30 13:10 13:10


 


Hemoglobin A1c    5.1


 


RPR  Nonreactive  


 


Hepatitis A IgM Ab   Negative 


 


Hep Bs Antigen   Negative 


 


Hep B Core IgM Ab   Negative 


 


Hepatitis C Antibody   Negative 


 


HIV 1&2 Antibody Screen   














  07/18/18





  13:10


 


Hemoglobin A1c 


 


RPR 


 


Hepatitis A IgM Ab 


 


Hep Bs Antigen 


 


Hep B Core IgM Ab 


 


Hepatitis C Antibody 


 


HIV 1&2 Antibody Screen  Negative











Consultations:: List each consultation separately and include:  1. Reason for 

request.  2. Findings.  3. Follow-up


Summary of Hospital Course include:: 1. Description of specific treatment plan 

utilized for patients during their course of treatmen.  2. Summarize the time-

course for resolution of acute symptoms and/or regressed behaviors.  3. 

Describe issues identified and worked on during hospitalization.  4. Describe 

medication utilized.  5. Describe medical problems identified and treated.  6. 

Reassessment of suicide risk


Summary of Hospital Course: 


Jerome Mendoza is a 49 yo  male with a hx of depression and 

heroin use who was sent as a transfer from [facility] for making suicidal statements, 

pt was under voluntary status, pt was found to be not committable in [facility]. The 

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 He reports becoming 

increasingly frustrated with his father because he as many needs after having a 

stroke last year. The patient expressed suicidal ideation to his friend who 

suggested he get help "I just told him the way I always feel and he suggested 

me to go to the hospital". The patient then brought himself to [facility] ED. 

According to medical records, in the ED, he endorsed SI and depression. He also 

admitted to snorting heroin 2 days prior. He states he brought himself to the 

hospital instead of attempting suicide because he cares about his parents and 

wants to get better for them.





The patient reports being depressed for awhile. He admits to one suicide 

attempt a long time ago by OD on pills. He reports being hospitalized for 

weeks after this attempt. He endorses low mood, poor sleep and appetite, and 

anhedonia. He appears to be future-oriented regarding caring for his parents; 

however, when asked what his future plans are, he replies, I cant even see 

tomorrow right now. Patient endorses irritability but denies other symptoms of 

ashley, including lack of need for sleep, grandiosity, pressured speech. He 

endorses anxiety regarding hi home life, but denies panic attacks or obsessions 

or compulsions. He denies paranoia, delusions, and hallucinations. He endorses 

heroin use intranasally. He denies alcohol or other drugs, including IVDA. 

Patient denies history of trauma or abuse.





Patient was previously hospitalized at [facility]. He also has been in rehab 

programs. The patient denies taking any psych meds now and does not see an 

outpatient psychiatrist. He states that the only time he took psych meds was 

while he was in [facility]. According to medical records, he was seen by PES at [facility] 

in April for bizarre behavior and heroin use- he was discharged from the ED. He 

was then seen at [facility] ED in 5/18 after being brought in by police fro bizarre 

behavior/agitation. Collateral was obtained from his mother at that time, and, 

according to PES worker, she stated that he makes suicidal statements when 

using drugs. 





during the initial assessment pt denied AVH, paranoia. He denied HI. Patient 

denied SI with a plan at this time and was able to contract for safety.  





Past psych hx:


[facility] inpatient


IOP 2014


Rehab for opioid abuse





PMH:


Active herpes infection- genital/anal, ?hands


?cardiomegaly





FH:


Father- CVA


Mother- poor physical health


No siblings or children


Denies h/o mental illness or suicide attempts





SH:


Lives with parents


Never , no children


Heroin intranasal- amount/frequency varies, last used 5 days ago


10 cigarettes/day


Denies alcohol use


Denies other illicit drug use, including IVDA





pt submitted 48hr notice today, requesting discharge, pt said that the 

treatment what he gets here "I could have it at home". pt said "I am not 

suicidal", when was asked what have changed "I do not feel suicidal now, this 

is the way I feel, that is my explanation". 





Patient presented to be depressed, flat affect. pt obviously might benefit from 

further evaluation and stabilization. 





pt was seen by medical, ID team.





as per staff pt takes meds, but not participating in unit activities, has good 

appetite and sleep.





no agitation no aggression.





pt has future oriented plans "I want to go for out patient program at [facility]". 





At the time of the discharge pt denied been depressed, denied thoughts of 

harming self or others, denied psychotic symptoms, and pt does not appeared to 

be psychotic, denied been anxious, pt is not in  imminent danger to self or 

others, will be following up at [facility], information about follow up 

appointment, time and address provided to the pt, it is patient responsibility 

to follow up with outpatient clinic, PMD as well as specialists (see  note 

for more detailed information).


In case pt will need to obtain results of studies pending at discharge pt was 

provided with contact information of Psychiatric Inpatient unit [phone] 

as well as Medical Record Department [phone].


Nicotine patch was offered, pt declined


Naltrexone treatment not indicated at this time because pt is opioid user


Counseling about smoking and alcohol cessation provided


AA meetings as well as smoking cessation treatment program information was 

provided by the 


no meds provided 


Pt was educated about safety plan in case of worsening of  symptoms or in case 

of suicidal or homicidal ideation call 911 or go to the nearest ER, also was 

educated to take meds as prescribed and stay away from drugs, pt verbalized 

understanding.





pt was d/c AMA, pt has a capacity to sign AMA.





- Diagnosis


(1) Mood disorder


Current Visit: Yes   Status: Chronic   Priority: Medium   





(2) Opioid abuse


Current Visit: Yes   Status: Chronic   Priority: Medium   





- Final Diagnosis (DSM 5)


Condition upon Discharge: FAIR


Disposition: AGAINST MEDICAL ADVICE


Follow-up Treatment Plan: 


At the time of the discharge pt denied been depressed, denied thoughts of 

harming self or others, denied psychotic symptoms, and pt does not appeared to 

be psychotic, denied been anxious, pt is not in  imminent danger to self or 

others, will be following up at [facility], information about follow up 

appointment, time and address provided to the pt, it is patient responsibility 

to follow up with outpatient clinic, PMD as well as specialists (see  note 

for more detailed information).


In case pt will need to obtain results of studies pending at discharge pt was 

provided with contact information of Psychiatric Inpatient unit [phone] 

as well as Medical Record Department [phone].


Nicotine patch was offered, pt declined


Naltrexone treatment not indicated at this time because pt is opioid user


Counseling about smoking and alcohol cessation provided


AA meetings as well as smoking cessation treatment program information was 

provided by the 


no meds provided 


Pt was educated about safety plan in case of worsening of  symptoms or in case 

of suicidal or homicidal ideation call 911 or go to the nearest ER, also was 

educated to take meds as prescribed and stay away from drugs, pt verbalized 

understanding.





pt was d/c AMA, pt has a capacity to sign AMA.








- Smoking Cessation


Smoking Cessation Medication prescribed: No


Reason for not providing: pt refused





- Antipsychotic Medications


Pt discharged on 2 or more routine antipsychotic medications: No

## 2018-07-19 NOTE — PCM.PYCHPN
Psychiatric Progress Note





- Psychiatric Progress Note


Patient seen today, length of contact: 30 minutes


Patient Chief Complaint: 





I want to get out of here. Either you let me go home or call the police.





Medication Change: Yes


Medical Record Reviewed: Yes


Consults ordered or reviewed: 





medicine








Mental Status Examination





- Cognitive Function


Orientation: Person, Place, Situation





- Mood


Mood: Depressed





- Affect


Affect: Flat





- Formal Thought Process


Formal Thought Process: No Impairment, Paranoia

## 2018-10-08 ENCOUNTER — HOSPITAL ENCOUNTER (INPATIENT)
Dept: HOSPITAL 42 - ED | Age: 48
LOS: 1 days | Discharge: LEFT BEFORE DISCHARGE | DRG: 277 | End: 2018-10-09
Attending: INTERNAL MEDICINE | Admitting: INTERNAL MEDICINE
Payer: MEDICAID

## 2018-10-08 VITALS — RESPIRATION RATE: 19 BRPM | OXYGEN SATURATION: 100 %

## 2018-10-08 VITALS — BODY MASS INDEX: 25.1 KG/M2

## 2018-10-08 DIAGNOSIS — F11.10: ICD-10-CM

## 2018-10-08 DIAGNOSIS — Z80.8: ICD-10-CM

## 2018-10-08 DIAGNOSIS — D64.9: ICD-10-CM

## 2018-10-08 DIAGNOSIS — Z82.3: ICD-10-CM

## 2018-10-08 DIAGNOSIS — L03.116: ICD-10-CM

## 2018-10-08 DIAGNOSIS — F17.210: ICD-10-CM

## 2018-10-08 DIAGNOSIS — Z86.19: ICD-10-CM

## 2018-10-08 DIAGNOSIS — F32.9: ICD-10-CM

## 2018-10-08 DIAGNOSIS — K21.9: ICD-10-CM

## 2018-10-08 DIAGNOSIS — L02.416: Primary | ICD-10-CM

## 2018-10-08 DIAGNOSIS — Z53.21: ICD-10-CM

## 2018-10-08 LAB
ALBUMIN SERPL-MCNC: 3.7 G/DL (ref 3–4.8)
ALBUMIN/GLOB SERPL: 0.9 {RATIO} (ref 1.1–1.8)
ALT SERPL-CCNC: 39 U/L (ref 7–56)
APTT BLD: 32.5 SECONDS (ref 25.1–36.5)
AST SERPL-CCNC: 48 U/L (ref 17–59)
BASOPHILS # BLD AUTO: 0.05 K/MM3 (ref 0–2)
BASOPHILS NFR BLD: 0.3 % (ref 0–3)
BUN SERPL-MCNC: 17 MG/DL (ref 7–21)
CALCIUM SERPL-MCNC: 9 MG/DL (ref 8.4–10.5)
EOSINOPHIL # BLD: 0.2 10*3/UL (ref 0–0.7)
EOSINOPHIL NFR BLD: 0.9 % (ref 1.5–5)
ERYTHROCYTE [DISTWIDTH] IN BLOOD BY AUTOMATED COUNT: 13 % (ref 11.5–14.5)
GFR NON-AFRICAN AMERICAN: > 60
GRANULOCYTES # BLD: 15.36 10*3/UL (ref 1.4–6.5)
GRANULOCYTES NFR BLD: 85.3 % (ref 50–68)
HGB BLD-MCNC: 9.4 G/DL (ref 14–18)
INR PPP: 1.15
LYMPHOCYTES # BLD: 1.4 10*3/UL (ref 1.2–3.4)
LYMPHOCYTES NFR BLD AUTO: 7.8 % (ref 22–35)
MCH RBC QN AUTO: 29.9 PG (ref 25–35)
MCHC RBC AUTO-ENTMCNC: 33.2 G/DL (ref 31–37)
MCV RBC AUTO: 90.1 FL (ref 80–105)
MONOCYTES # BLD AUTO: 1 10*3/UL (ref 0.1–0.6)
MONOCYTES NFR BLD: 5.7 % (ref 1–6)
PLATELET # BLD: 480 10^3/UL (ref 120–450)
PMV BLD AUTO: 9.4 FL (ref 7–11)
PROTHROMBIN TIME: 13.2 SECONDS (ref 9.4–12.5)
RBC # BLD AUTO: 3.14 10^6/UL (ref 3.5–6.1)
WBC # BLD AUTO: 18 10^3/UL (ref 4.5–11)

## 2018-10-08 RX ADMIN — VANCOMYCIN HYDROCHLORIDE SCH: 1 INJECTION, POWDER, LYOPHILIZED, FOR SOLUTION INTRAVENOUS at 21:59

## 2018-10-08 RX ADMIN — DEXTROSE AND SODIUM CHLORIDE SCH MLS/HR: 5; 900 INJECTION, SOLUTION INTRAVENOUS at 13:39

## 2018-10-08 RX ADMIN — DEXTROSE AND SODIUM CHLORIDE SCH MLS/HR: 5; 900 INJECTION, SOLUTION INTRAVENOUS at 03:47

## 2018-10-08 RX ADMIN — VANCOMYCIN HYDROCHLORIDE SCH MLS/HR: 1 INJECTION, POWDER, LYOPHILIZED, FOR SOLUTION INTRAVENOUS at 09:24

## 2018-10-08 RX ADMIN — PIPERACILLIN AND TAZOBACTAM SCH: 3; .375 INJECTION, POWDER, LYOPHILIZED, FOR SOLUTION INTRAVENOUS; PARENTERAL at 22:00

## 2018-10-08 RX ADMIN — DEXTROSE AND SODIUM CHLORIDE SCH: 5; 900 INJECTION, SOLUTION INTRAVENOUS at 22:01

## 2018-10-08 NOTE — CP.PCM.CON
History of Present Illness





- History of Present Illness


History of Present Illness: 





 48M presented to [facility] [place] ED with complaints of left lower extremity pain. General 

Surgery consulted for left lower leg (anterior) abscess. Patient refused bedside

incision and drainage procedure. Risks of lack of intervention were explained to

the patient. However, he refused to have local anesthetic injected. Stated he 

did not want I&D to be done. 











PMHx: HSV


PSHx: denies


Meds: denies


All: lactose


Social: smoker 20 pack years, denies ETOH, IVDU


Fam hx: father - CVA, mom - brain CA





Review of Systems





- Review of Systems


Systems not reviewed;Unavailable: Uncooperative





Past Patient History





- Infectious Disease


Hx of Infectious Diseases: None





- Past Social History


Smoking Status: Current Some Days Smoker


Alcohol: None


Drugs: Denies


Home Situation {Lives}: With Family





- CARDIAC


Hx Cardiac Disorders: No


Hx Hypertension: No





- PULMONARY


Hx Tuberculosis: No





- NEUROLOGICAL


HX Cerebrovascular Accident: No


Hx Seizures: No





- HEENT


Hx HEENT Problems: No





- RENAL


Hx Chronic Kidney Disease: No





- ENDOCRINE/METABOLIC


Hx Endocrine Disorders: No





- HEMATOLOGICAL/ONCOLOGICAL


Hx Cancer: No





- INTEGUMENTARY


Hx Dermatological Problems: Yes


Other/Comment: recent Herpes outbreak





- MUSCULOSKELETAL/RHEUMATOLOGICAL


Hx Falls: No





- GASTROINTESTINAL


Hx Gastroesophageal Reflux: Yes





- GENITOURINARY/GYNECOLOGICAL


Hx Sexually Transmitted Disorders: Yes (herpes)





- PSYCHIATRIC


Hx Depression: Yes


Hx Substance Use: Yes (heroin abuse)





- SURGICAL HISTORY


Hx Surgeries: No





- ANESTHESIA


Hx Anesthesia: No





Meds


Allergies/Adverse Reactions: 


                                    Allergies











Allergy/AdvReac Type Severity Reaction Status Date / Time


 


lactose Allergy  DIARRHEA Verified 10/08/18 00:29














- Medications


Medications: 


                               Current Medications





Dextrose/Sodium Chloride (Dextrose 5%/0.9% Ns 1000 Ml)  1,000 mls @ 100 mls/hr 

IV .Q10H KUSUM


   Last Admin: 10/08/18 13:39 Dose:  100 mls/hr


Vancomycin HCl (Vancomycin 1gm)  1 gm in 250 mls @ 167 mls/hr IVPB Q12 KUSUM; 

Protocol


   Last Admin: 10/08/18 09:24 Dose:  167 mls/hr


Piperacillin Sod/Tazobactam Sod (Zosyn 3.375 In Ns 100ml)  100 mls @ 25 mls/hr 

IVPB Q8 KUSUM; Protocol


   Stop: 10/15/18 14:01


Ibuprofen (Motrin Tab)  600 mg PO Q6H PRN


   PRN Reason: Pain, moderate (4-7)











Physical Exam





- Constitutional


Appears: Non-toxic





- Head Exam


Head Exam: NORMOCEPHALIC





- Eye Exam


Eye Exam: EOMI, Normal appearance





- ENT Exam


ENT Exam: Mucous Membranes Moist





- Respiratory Exam


Respiratory Exam: NORMAL BREATHING PATTERN





- Cardiovascular Exam


Cardiovascular Exam: +S1, +S2





- GI/Abdominal Exam


GI & Abdominal Exam: Soft





- Extremities Exam


Additional comments: 





fluctuant skin along anterior aspect of left lower extremity 





- Neurological Exam


Neurological exam: Alert, Oriented x3





- Psychiatric Exam


Psychiatric exam: Normal Mood





- Skin


Skin Exam: Dry, Intact, Warm





Results





- Vital Signs


Recent Vital Signs: 


                                Last Vital Signs











Temp  97.9 F   10/08/18 08:17


 


Pulse  63   10/08/18 08:17


 


Resp  18   10/08/18 08:18


 


BP  113/77   10/08/18 08:17


 


Pulse Ox  96   10/08/18 08:17














- Labs


Result Diagrams: 


                                 10/08/18 00:43





                                 10/08/18 00:43


Labs: 


                         Laboratory Results - last 24 hr











  10/08/18 10/08/18 10/08/18





  00:43 00:43 00:43


 


WBC  18.0 H D  


 


RBC  3.14 L  


 


Hgb  9.4 L D  


 


Hct  28.3 L  


 


MCV  90.1  


 


MCH  29.9  


 


MCHC  33.2  


 


RDW  13.0  


 


Plt Count  480 H  


 


MPV  9.4  


 


Gran %  85.3 H  


 


Lymph % (Auto)  7.8 L  


 


Mono % (Auto)  5.7  


 


Eos % (Auto)  0.9 L  


 


Baso % (Auto)  0.3  


 


Gran #  15.36 H  


 


Lymph # (Auto)  1.4  


 


Mono # (Auto)  1.0 H  


 


Eos # (Auto)  0.2  


 


Baso # (Auto)  0.05  


 


PT   13.2 H 


 


INR   1.15 


 


APTT   32.5 


 


Sodium    140


 


Potassium    4.5


 


Chloride    101


 


Carbon Dioxide    30


 


Anion Gap    14


 


BUN    17


 


Creatinine    1.0


 


Est GFR ( Amer)    > 60


 


Est GFR (Non-Af Amer)    > 60


 


Random Glucose    84


 


Calcium    9.0


 


Total Bilirubin    0.6


 


AST    48


 


ALT    39


 


Alkaline Phosphatase    60


 


Total Protein    7.9


 


Albumin    3.7


 


Globulin    4.2


 


Albumin/Globulin Ratio    0.9 L














Assessment & Plan





- Assessment and Plan (Free Text)


Assessment: 





48M with left anterior lower leg abscess 


Plan: 





Patient refusing surgical intervention at this present time 


Patient being extremely uncooperative 


At this time recommend applying warm compresses to affected area with hopes it 

will spontaneously drain


Will continue to monitor and follow 


C/w ABx as per ID 


Further recs per Dr. Estevan Rubalcava PGY3

## 2018-10-08 NOTE — CP.PCM.CON
<Cherelle Aguilar - Last Filed: 10/08/18 13:32>





History of Present Illness





- History of Present Illness


History of Present Illness: 


PGY-3 resident ID consult note for Dr. Dyer





Reason for consult:  left leg cellulites





Patient is a 47 y/o male with PMHx of HSV infection, GERD and depression, lavelle

ysubstance abuse presenting with left lower extremity pain and ID is consulted 

for antibiotic regiments. Patient's non cooperative and refused to speak to me, 

thus most of the history was obtained from the chart. As per chart patient had 

the left leg pain for weeks, came in yesterday due to worsening in pain. 


Unable to obtain ROS due to non cooperation from the patient. 





PMHx: HSV


PSHx: unknown


FMHx: none


Social: smoker 20 pack years, denies ETOH, history of intranasal heroin


Allergy: NKDA











Review of Systems





- Review of Systems


Systems not reviewed;Unavailable: Uncooperative





Past Patient History





- Infectious Disease


Hx of Infectious Diseases: None





- Past Social History


Smoking Status: Current Some Days Smoker


Alcohol: None


Drugs: Denies


Home Situation {Lives}: With Family





- CARDIAC


Hx Cardiac Disorders: No


Hx Hypertension: No





- PULMONARY


Hx Tuberculosis: No





- NEUROLOGICAL


HX Cerebrovascular Accident: No


Hx Seizures: No





- HEENT


Hx HEENT Problems: No





- RENAL


Hx Chronic Kidney Disease: No





- ENDOCRINE/METABOLIC


Hx Endocrine Disorders: No





- HEMATOLOGICAL/ONCOLOGICAL


Hx Cancer: No





- INTEGUMENTARY


Hx Dermatological Problems: Yes


Other/Comment: recent Herpes outbreak





- MUSCULOSKELETAL/RHEUMATOLOGICAL


Hx Falls: No





- GASTROINTESTINAL


Hx Gastroesophageal Reflux: Yes





- GENITOURINARY/GYNECOLOGICAL


Hx Sexually Transmitted Disorders: Yes (herpes)





- PSYCHIATRIC


Hx Depression: Yes


Hx Substance Use: Yes (heroin abuse)





- SURGICAL HISTORY


Hx Surgeries: No





- ANESTHESIA


Hx Anesthesia: No





Meds


Allergies/Adverse Reactions: 


                                    Allergies











Allergy/AdvReac Type Severity Reaction Status Date / Time


 


lactose Allergy  DIARRHEA Verified 10/08/18 00:29














- Medications


Medications: 


                               Current Medications





Dextrose/Sodium Chloride (Dextrose 5%/0.9% Ns 1000 Ml)  1,000 mls @ 100 mls/hr 

IV .Q10H KUSUM


   Last Admin: 10/08/18 03:47 Dose:  100 mls/hr


Vancomycin HCl (Vancomycin 1gm)  1 gm in 250 mls @ 167 mls/hr IVPB Q12 KUSUM; 

Protocol


   Last Admin: 10/08/18 09:24 Dose:  167 mls/hr


Piperacillin Sod/Tazobactam Sod (Zosyn 3.375 In Ns 100ml)  100 mls @ 25 mls/hr 

IVPB Q8 KUSUM; Protocol


   Stop: 10/15/18 14:01


Ibuprofen (Motrin Tab)  600 mg PO Q6H PRN


   PRN Reason: Pain, moderate (4-7)











Physical Exam





- Constitutional


Appears: No Acute Distress, Older Than Stated Age





- Skin


Additional comments: 


Focused exam: 


Left lower extremity with 3 areas of abscess, 2 fluctuant, 1 opened draining 

purulent discharge, + erythema and skin discoloration, warm to touch. 





Results





- Vital Signs


Recent Vital Signs: 


                                Last Vital Signs











Temp  97.9 F   10/08/18 08:17


 


Pulse  63   10/08/18 08:17


 


Resp  18   10/08/18 08:18


 


BP  113/77   10/08/18 08:17


 


Pulse Ox  96   10/08/18 08:17














- Labs


Result Diagrams: 


                                 10/08/18 00:43





                                 10/08/18 00:43


Labs: 


                         Laboratory Results - last 24 hr











  10/08/18 10/08/18 10/08/18





  00:43 00:43 00:43


 


WBC  18.0 H D  


 


RBC  3.14 L  


 


Hgb  9.4 L D  


 


Hct  28.3 L  


 


MCV  90.1  


 


MCH  29.9  


 


MCHC  33.2  


 


RDW  13.0  


 


Plt Count  480 H  


 


MPV  9.4  


 


Gran %  85.3 H  


 


Lymph % (Auto)  7.8 L  


 


Mono % (Auto)  5.7  


 


Eos % (Auto)  0.9 L  


 


Baso % (Auto)  0.3  


 


Gran #  15.36 H  


 


Lymph # (Auto)  1.4  


 


Mono # (Auto)  1.0 H  


 


Eos # (Auto)  0.2  


 


Baso # (Auto)  0.05  


 


PT   13.2 H 


 


INR   1.15 


 


APTT   32.5 


 


Sodium    140


 


Potassium    4.5


 


Chloride    101


 


Carbon Dioxide    30


 


Anion Gap    14


 


BUN    17


 


Creatinine    1.0


 


Est GFR ( Amer)    > 60


 


Est GFR (Non-Af Amer)    > 60


 


Random Glucose    84


 


Calcium    9.0


 


Total Bilirubin    0.6


 


AST    48


 


ALT    39


 


Alkaline Phosphatase    60


 


Total Protein    7.9


 


Albumin    3.7


 


Globulin    4.2


 


Albumin/Globulin Ratio    0.9 L














Assessment & Plan





- Assessment and Plan (Free Text)


Assessment: 


Patient is a 47 y/o with 


Cellulites and abscess of the left lower extremities 


h/o HSV infection


GERD 


depression, 


polysubstance abuse


Plan: 


Patient with leukocytosis on presentation, afebrile. Tib/fib extremity x-ray 

with no acute findings. Left extremity ultrasound ordered to evaluate for 

abscess/fluid collection. Blood culture and wound culture ordered. Patient might

need I&D, consider surgery consult. Obtain wound culture. Continue with zosyn 

and vanco pending cultures. 





Patient seen, examined and case discussed with Dr. Dyer. 





- Date & Time


Date: 10/08/18


Time: 13:00





<Andrae Dyer - Last Filed: 10/08/18 21:58>





Meds





- Medications


Medications: 


                               Current Medications





Dextrose/Sodium Chloride (Dextrose 5%/0.9% Ns 1000 Ml)  1,000 mls @ 100 mls/hr 

IV .Q10H KUSUM


   Last Admin: 10/08/18 13:39 Dose:  100 mls/hr


Vancomycin HCl (Vancomycin 1gm)  1 gm in 250 mls @ 167 mls/hr IVPB Q12 KUSUM; 

Protocol


   Last Admin: 10/08/18 09:24 Dose:  167 mls/hr


Piperacillin Sod/Tazobactam Sod (Zosyn 3.375 In Ns 100ml)  100 mls @ 25 mls/hr 

IVPB Q8 KUSUM; Protocol


   Stop: 10/15/18 14:01


Ibuprofen (Motrin Tab)  600 mg PO Q6H PRN


   PRN Reason: Pain, moderate (4-7)











Results





- Vital Signs


Recent Vital Signs: 


                                Last Vital Signs











Temp  98.4 F   10/08/18 16:38


 


Pulse  67   10/08/18 16:38


 


Resp  19   10/08/18 16:38


 


BP  119/90   10/08/18 16:38


 


Pulse Ox  100   10/08/18 16:38














- Labs


Result Diagrams: 


                                 10/08/18 00:43





                                 10/08/18 00:43


Labs: 


                         Laboratory Results - last 24 hr











  10/08/18 10/08/18 10/08/18





  00:43 00:43 00:43


 


WBC  18.0 H D  


 


RBC  3.14 L  


 


Hgb  9.4 L D  


 


Hct  28.3 L  


 


MCV  90.1  


 


MCH  29.9  


 


MCHC  33.2  


 


RDW  13.0  


 


Plt Count  480 H  


 


MPV  9.4  


 


Gran %  85.3 H  


 


Lymph % (Auto)  7.8 L  


 


Mono % (Auto)  5.7  


 


Eos % (Auto)  0.9 L  


 


Baso % (Auto)  0.3  


 


Gran #  15.36 H  


 


Lymph # (Auto)  1.4  


 


Mono # (Auto)  1.0 H  


 


Eos # (Auto)  0.2  


 


Baso # (Auto)  0.05  


 


PT   13.2 H 


 


INR   1.15 


 


APTT   32.5 


 


Sodium    140


 


Potassium    4.5


 


Chloride    101


 


Carbon Dioxide    30


 


Anion Gap    14


 


BUN    17


 


Creatinine    1.0


 


Est GFR ( Amer)    > 60


 


Est GFR (Non-Af Amer)    > 60


 


Random Glucose    84


 


Calcium    9.0


 


Total Bilirubin    0.6


 


AST    48


 


ALT    39


 


Alkaline Phosphatase    60


 


Total Protein    7.9


 


Albumin    3.7


 


Globulin    4.2


 


Albumin/Globulin Ratio    0.9 L














Assessment & Plan





- Assessment and Plan (Free Text)


Plan: 








Infectious Diseases Attending Physician Addendum


Patient seen, examined, discussed with medical resident. I have reviewed the 

pertinent clinical information. I agree with the above findings, assessment and 

plan and in addition, we have started the patient on Vancomycin and Zosyn for 

left leg skin and skin structure infection with possible abscess. Follow up 

blood and wound cx, follow up plan of surgery for I and D. HIV status was 

negative in July 2018.

## 2018-10-08 NOTE — RAD
Date of service: 



10/08/2018



PROCEDURE:  Radiographs of the left tibia and fibula. 



HISTORY:

leg pain



COMPARISON:

None available.



TECHNIQUE:

Frontal and lateral views obtained. 



FINDINGS:



BONES:

No fracture or destructive lesion.



JOINT SPACES:

Unremarkable.



OTHER FINDINGS:

None.



IMPRESSION:

Unremarkable radiographs of the left tibia and fibula.

## 2018-10-08 NOTE — ED PDOC
Arrival/HPI





- General


Historian: Patient





- History of Present Illness


Narrative History of Present Illness (Text): 





10/08/18 00:37


47yo male with pmhx of GERD, substance abuse bib EMS with complaint of left 

lower leg redness, pain and purulent discharge. States he have had these s

ymptoms for weeks, but it became more painful over the past 4days. Report 

difficult with ambulation secondary to pain from the leg. He denies fever, 

chills, nausea, vomiting, diarrhea, chest pain, any other complaint.





<Bridget Sumner - Last Filed: 10/08/18 01:53>





<Mio Bonilla - Last Filed: 10/08/18 23:22>





- General


Chief Complaint: Lower Extremity Problem/Injury


Time Seen by Provider: 10/08/18 00:21





Past Medical History





- Provider Review


Nursing Documentation Reviewed: Yes





- Infectious Disease


Hx of Infectious Diseases: None





- Cardiac


Hx Cardiac Disorders: No


Hx Hypertension: No





- Pulmonary


Hx Tuberculosis: No





- Neurological


HX Cerebrovascular Accident: No


Hx Seizures: No





- HEENT


Hx HEENT Disorder: No





- Renal


Hx Renal Disorder: No





- Endocrine/Metabolic


Hx Endocrine Disorders: No





- Hematological/Oncological


Hx Cancer: No





- Integumentary


Hx Dermatological Disorder: Yes


Other/Comment: recent Herpes outbreak





- Musculoskeletal/Rheumatological


Hx Musculoskeletal Disorders: No





- Gastrointestinal


Hx Gastroesophageal Reflux: Yes





- Genitourinary/Gynecological


Hx Sexually Transmitted Diseases: Yes (herpes)





- Psychiatric


Hx Depression: Yes


Hx Substance Use: Yes (heroin abuse)





- Anesthesia


Hx Anesthesia: No





<Bridget Sumner - Last Filed: 10/08/18 01:53>





Family/Social History





- Physician Review


Nursing Documentation Reviewed: Yes


Family/Social History: Unknown Family HX


Smoking Status: Unknown If Ever Smoked


Hx Alcohol Use: No (unknown)


Hx Substance Use: Yes (heroin abuse)





<Bridget Sumner - Last Filed: 10/08/18 01:53>





Allergies/Home Meds





<Bridget Sumner A - Last Filed: 10/08/18 01:53>





<Mio Bonilla - Last Filed: 10/08/18 23:22>


Allergies/Adverse Reactions: 


Allergies





lactose Allergy (Verified 10/08/18 00:29)


   DIARRHEA








Home Medications: 


                                    Home Meds











 Medication  Instructions  Recorded  Confirmed


 


Acyclovir/Hydrocortisone [Xerese 1 cre TP BID 07/18/18 07/18/18





5%-1%]   














Review of Systems





- Physician Review


All systems were reviewed & negative as marked: Yes





- Review of Systems


Constitutional: Normal


Eyes: Normal


ENT: Normal


Respiratory: Normal


Cardiovascular: Normal


Gastrointestinal: Normal


Genitourinary Male: Normal


Musculoskeletal: Arthralgias (Left leg pain/redness)


Skin: Normal


Neurological: Normal


Endocrine: Normal


Hemo/Lymphatic: Normal


Psychiatric: Normal





<Bridget anderson A - Last Filed: 10/08/18 01:53>





Physical Exam


Vital Signs Reviewed: Yes


Temperature: Afebrile


Blood Pressure: Normal


Pulse: Regular


Respiratory Rate: Normal


Appearance: Positive for: Well-Appearing, Non-Toxic, Comfortable


Pain Distress: None


Mental Status: Positive for: Alert and Oriented X 3





- Systems Exam


Head: Present: Atraumatic, Normocephalic


Pupils: Present: PERRL


Extroacular Muscles: Present: EOMI


Conjunctiva: Present: Normal


Mouth: Present: Moist Mucous Membranes


Neck: Present: Normal Range of Motion


Respiratory/Chest: Present: Clear to Auscultation, Good Air Exchange.  No: 

Respiratory Distress, Accessory Muscle Use


Cardiovascular: Present: Regular Rate and Rhythm, Normal S1, S2.  No: Murmurs


Abdomen: No: Tenderness, Distention, Peritoneal Signs


Back: Present: Normal Inspection


Upper Extremity: Present: Normal Inspection.  No: Cyanosis, Edema


Lower Extremity: Present: NORMAL PULSES, Tenderness (Left lower mid to distal 

left leg), Erythema (Left lower leg anteriorlly with central nonfluctuant 

nodule/abscess approximately 5 x 5 noted), Temperature Abnormalties (Warm to 

touch).  No: Edema, CALF TENDERNESS


Neurological: Present: GCS=15, CN II-XII Intact, Speech Normal


Skin: Present: Warm, Dry, Normal Color.  No: Rashes


Psychiatric: Present: Alert, Oriented x 3, Normal Insight, Normal Concentration





<Bridget Sumner A - Last Filed: 10/08/18 01:53>





Vital Signs











  Pulse Resp BP Pulse Ox


 


 10/08/18 02:00  100 H  18  144/90  96














<Mio Bonilla - Last Filed: 10/08/18 23:22>





Medical Decision Making


ED Course and Treatment: 





10/08/18 01:43


47yo male bib EMS for left lower leg infection x days.





Cellulitis of LLE noted. Labs was ordered to evaluation for possible oupt oral 

abx





Labs


blood culture


Left tib/fib xray


CXR





Leukocytosis noted. Vanco and Rocephin ordered. Pt is a drug user. He will be 

admitted for IV abx





Case was DW Dr. Sorto and pt was admitted to they service.








<Bridget Sumner A - Last Filed: 10/08/18 01:53>





- Lab Interpretations


Lab Results: 











                                 10/08/18 00:43 





                                 10/08/18 00:43 





                                   Lab Results





10/08/18 00:43: Sodium 140, Potassium 4.5, Chloride 101, Carbon Dioxide 30, 

Anion Gap 14, BUN 17, Creatinine 1.0, Est GFR (African Amer) > 60, Est GFR (Non-

Af Amer) > 60, Random Glucose 84, Calcium 9.0, Total Bilirubin 0.6, AST 48, ALT 

39, Alkaline Phosphatase 60, Total Protein 7.9, Albumin 3.7, Globulin 4.2, 

Albumin/Globulin Ratio 0.9 L


10/08/18 00:43: PT 13.2 H, INR 1.15, APTT 32.5


10/08/18 00:43: WBC 18.0 H D, RBC 3.14 L, Hgb 9.4 L D, Hct 28.3 L, MCV 90.1, MCH

29.9, MCHC 33.2, RDW 13.0, Plt Count 480 H, MPV 9.4, Gran % 85.3 H, Lymph % 

(Auto) 7.8 L, Mono % (Auto) 5.7, Eos % (Auto) 0.9 L, Baso % (Auto) 0.3, Gran # 

15.36 H, Lymph # (Auto) 1.4, Mono # (Auto) 1.0 H, Eos # (Auto) 0.2, Baso # 

(Auto) 0.05











- RAD Interpretation


Radiology Orders: 











10/08/18 01:21


TIBIA FIBULA LEFT [RAD] Stat 














- Medication Orders


Current Medication Orders: 











Dextrose/Sodium Chloride (Dextrose 5%/0.9% Ns 1000 Ml)  1,000 mls @ 100 mls/hr 

IV .Q10H KUSUM


   Last Admin: 10/08/18 22:01 Dose:  Not Given


   Non-Admin Reason: PT REFUSING IVF AT THIS TIME





Vancomycin HCl (Vancomycin 1gm)  1 gm in 250 mls @ 167 mls/hr IVPB Q12 KUSUM; 

Protocol


   Last Admin: 10/08/18 21:59 Dose:  Not Given


   Non-Admin Reason: PT REFUSING ALL ABX





Piperacillin Sod/Tazobactam Sod (Zosyn 3.375 In Ns 100ml)  100 mls @ 25 mls/hr 

IVPB Q8 KUSUM; Protocol


   Stop: 10/15/18 14:01


   Last Admin: 10/08/18 22:00 Dose:  Not Given


   Non-Admin Reason: PT REFUSING ALL ABX





Ibuprofen (Motrin Tab)  600 mg PO Q6H PRN


   PRN Reason: Pain, moderate (4-7)





Discontinued Medications





Ceftriaxone Sodium (Rocephin 1 Gram Ivpb)  1 gm in 100 mls @ 200 mls/hr IVPB 

STAT STA; Protocol


   Stop: 10/08/18 01:54


   Last Admin: 10/08/18 02:13  Dose: 200 mls/hr





eMAR Start Stop


 Document     10/08/18 02:13  SS  (Rec: 10/08/18 02:13  SS  [id number])


     Intravenous Solution


      Start Date                                 10/08/18


      Start Time                                 02:13


      End Date                                   10/08/18


      End time                                   02:43


      Total Infusion Time                        30





Vancomycin HCl (Vancomycin 1gm)  1 gm in 250 mls @ 167 mls/hr IVPB STAT STA; 

Protocol


   Stop: 10/08/18 02:54


   Last Admin: 10/08/18 03:47  Dose: 167 mls/hr





eMAR Start Stop


 Document     10/08/18 03:47  SS  (Rec: 10/08/18 03:47  SS  [id number])


     Intravenous Solution


      Start Date                                 10/08/18


      Start Time                                 03:28


      End Date                                   10/08/18


      End time                                   04:58


      Total Infusion Time                        90





Vancomycin HCl (Vancomycin 1gm)  1 gm in 250 mls @ 167 mls/hr IVPB DAILY KUSUM; 

Protocol


Piperacillin Sod/Tazobactam Sod (Zosyn 3.375 In Ns 100ml)  100 mls @ 25 mls/hr 

IVPB Q12 KUSUM; Protocol


   Stop: 10/08/18 13:59


Piperacillin Sod/Tazobactam Sod (Zosyn 3.375 In Ns 100ml)  100 mls @ 25 mls/hr 

IVPB Q6H KUSUM; Protocol


   Stop: 10/08/18 08:59


   Last Admin: 10/08/18 06:00  Dose: 25 mls/hr





eMAR Start Stop


 Document     10/08/18 06:00  SD  (Rec: 10/08/18 07:07  SD  [id number])


     Intravenous Solution


      Start Date                                 10/08/18


      Start Time                                 07:07





Piperacillin Sod/Tazobactam Sod (Zosyn 3.375 In Ns 100ml)  100 mls @ 25 mls/hr 

IVPB Q8 KUSUM; Protocol


   Stop: 10/15/18 14:01


   Last Admin: 10/08/18 13:39  Dose: 25 mls/hr





eMAR Start Stop


 Document     10/08/18 13:39  BIR  (Rec: 10/08/18 13:40  BIR  [id number])


     Intravenous Solution


      Start Date                                 10/08/18


      Start Time                                 13:40


      End Date                                   10/08/18


      End time                                   15:00


      Total Infusion Time                        80














<Mio Bonilla - Last Filed: 10/08/18 23:22>





- PA / NP / Resident Statement


SHAHEEN has reviewed & agrees with the documentation as recorded.


SHAHEEN has examined the patient and agrees with the treatment plan.





<Mio Bonilla - Last Filed: 10/08/18 23:22>





Disposition/Present on Arrival





- Present on Arrival


Any Indicators Present on Arrival: No


History of DVT/PE: No


History of Uncontrolled Diabetes: No


Urinary Catheter: No


History of Decub. Ulcer: No


History Surgical Site Infection Following: None





- Disposition


Have Diagnosis and Disposition been Completed?: Yes


Disposition Time: 01:35


Patient Plan: Admission





<Bridget Sumner - Last Filed: 10/08/18 01:53>





<Mio Bonilla - Last Filed: 10/08/18 23:22>





- Disposition


Diagnosis: 


 Cellulitis





Disposition: HOSPITALIZED


Condition: STABLE

## 2018-10-08 NOTE — RAD
Date of service: 



10/08/2018



PROCEDURE:  CHEST RADIOGRAPH, 1 VIEW



HISTORY:

admission



COMPARISON:

7/18/2018



FINDINGS:



LUNGS:

Clear.



PLEURA:

No pneumothorax or pleural fluid seen.



CARDIOVASCULAR:

Normal.



OSSEOUS STRUCTURES:

No significant abnormalities.



VISUALIZED UPPER ABDOMEN:

Normal.



OTHER FINDINGS:

None. 



IMPRESSION:

No active disease.

## 2018-10-08 NOTE — CP.PCM.HP
<SakshiDaniellaRito - Last Filed: 10/08/18 06:04>





History of Present Illness





- History of Present Illness


History of Present Illness: 


Rito Riggs, PGY-1 History and Physical for Hospitalist Service





CC: LLE pain





HPI: Mr. Mendoza is a 48 M with PMHx of HSV infection, GERD, depression, and 

substance abuse who presents with worsening LLE pain. Patient complains of pain 

in left leg that inhibits his ability to walk. Patient reports this pain began a

few weeks ago, but ignored it under the pretense that it would improve on its 

own. Patient reports open ulcers in the past, but never painful to this extent. 

Patient denies recent IVDU and manually trying to drain the wound. Patient 

denies taking anything for the pain and reports resting as the only form of 

relief. Patient describes the pain as sharp and radiates down into his left 

foot.


Patient denies suicidal and homicidal ideations at this time.


Patient denies headaches, blurry vision, chest pain, shortness of breath, 

abdominal pain, urinary or bowel habit changes, recent travel and sick contacts.





PMHx: HSV


PSHx: denies


Meds: denies


All: lactose


Social: smoker 20 pack years, denies ETOH and heroin


Fam hx: father - CVA, mom - brain CA





PCP Dr. Rocha











Present on Admission





- Present on Admission


Any Indicators Present on Admission: No


History of DVT/PE: No





Review of Systems





- Review of Systems


Review of Systems: 


12 point ROS completed and negative except as described in HPI.








Past Patient History





- Infectious Disease


Hx of Infectious Diseases: None





- Past Social History


Smoking Status: Unknown If Ever Smoked





- CARDIAC


Hx Cardiac Disorders: No


Hx Hypertension: No





- PULMONARY


Hx Tuberculosis: No





- NEUROLOGICAL


HX Cerebrovascular Accident: No


Hx Seizures: No





- HEENT


Hx HEENT Problems: No





- RENAL


Hx Chronic Kidney Disease: No





- ENDOCRINE/METABOLIC


Hx Endocrine Disorders: No





- HEMATOLOGICAL/ONCOLOGICAL


Hx Cancer: No





- INTEGUMENTARY


Hx Dermatological Problems: Yes


Other/Comment: recent Herpes outbreak





- MUSCULOSKELETAL/RHEUMATOLOGICAL


Hx Musculoskeletal Disorders: No





- GASTROINTESTINAL


Hx Gastroesophageal Reflux: Yes





- GENITOURINARY/GYNECOLOGICAL


Hx Sexually Transmitted Disorders: Yes (herpes)





- PSYCHIATRIC


Hx Depression: Yes


Hx Substance Use: Yes (heroin abuse)





- SURGICAL HISTORY


Hx Surgeries: No





- ANESTHESIA


Hx Anesthesia: No





Meds


Allergies/Adverse Reactions: 


                                    Allergies











Allergy/AdvReac Type Severity Reaction Status Date / Time


 


lactose Allergy  DIARRHEA Verified 10/08/18 00:29














Physical Exam





- Constitutional


Appears: Well, Non-toxic, No Acute Distress





- Head Exam


Head Exam: ATRAUMATIC, NORMOCEPHALIC





- Eye Exam


Eye Exam: EOMI, Normal appearance





- ENT Exam


ENT Exam: Mucous Membranes Moist





- Neck Exam


Neck exam: Positive for: Normal Inspection





- Respiratory Exam


Respiratory Exam: Clear to Auscultation Bilateral, NORMAL BREATHING PATTERN.  

absent: Rhonchi, Wheezes, Respiratory Distress





- Cardiovascular Exam


Cardiovascular Exam: REGULAR RHYTHM, RRR, +S1, +S2





- GI/Abdominal Exam


GI & Abdominal Exam: Normal Bowel Sounds, Soft.  absent: Distended, Tenderness





- Extremities Exam


Additional comments: 





3 cm tender, erythematous closed indurated ulcer on L anterior shin. TTP.





- Neurological Exam


Neurological exam: Alert, Oriented x3





- Psychiatric Exam


Psychiatric exam: Anxious





- Skin


Skin Exam: Dry





Results





- Labs


Result Diagrams: 


                                 10/08/18 00:43





                                 10/08/18 00:43


Labs: 





                         Laboratory Results - last 24 hr











  10/08/18 10/08/18 10/08/18





  00:43 00:43 00:43


 


WBC  18.0 H D  


 


RBC  3.14 L  


 


Hgb  9.4 L D  


 


Hct  28.3 L  


 


MCV  90.1  


 


MCH  29.9  


 


MCHC  33.2  


 


RDW  13.0  


 


Plt Count  480 H  


 


MPV  9.4  


 


Gran %  85.3 H  


 


Lymph % (Auto)  7.8 L  


 


Mono % (Auto)  5.7  


 


Eos % (Auto)  0.9 L  


 


Baso % (Auto)  0.3  


 


Gran #  15.36 H  


 


Lymph # (Auto)  1.4  


 


Mono # (Auto)  1.0 H  


 


Eos # (Auto)  0.2  


 


Baso # (Auto)  0.05  


 


PT   13.2 H 


 


INR   1.15 


 


APTT   32.5 


 


Sodium    140


 


Potassium    4.5


 


Chloride    101


 


Carbon Dioxide    30


 


Anion Gap    14


 


BUN    17


 


Creatinine    1.0


 


Est GFR ( Amer)    > 60


 


Est GFR (Non-Af Amer)    > 60


 


Random Glucose    84


 


Calcium    9.0


 


Total Bilirubin    0.6


 


AST    48


 


ALT    39


 


Alkaline Phosphatase    60


 


Total Protein    7.9


 


Albumin    3.7


 


Globulin    4.2


 


Albumin/Globulin Ratio    0.9 L














Assessment & Plan





- Assessment and Plan (Free Text)


Assessment: 


Assessment: 48 M PMHx HSV and substance abuse who presents with LLE cellulitis.





Plan:


L anterior shin abscess


Leukocytosis 18, afebrile


NPO


Elevate affected extremity and warm compresses


f/u blood cx


UA, UDS


f/u AM labs


tibia/fibula x-ray ordered due to pain- no acute fracture or bony process 

appreciated, awaiting final read


CXR: no acute disease appreciated, no congestion noted. f/u final read


F/u U/S of LLE - will consider surgical consult for possible I&D based on 

results


ID consulted - recs appreciated for further Abx coverage, Ceftriaxone and Vanc 

in ED, Vanc and Zosyn currently on board


Motrin for pain


f/u wound culture





Normocytic Anemia


Hgb 9.4 from 12.6 5 months ago, MCV 90


no giuliana loss of blood or recent trauma


f/u FOBT





DVT ppx


Lovenox held due to anemia





Patient seen, case reviewed, and plan discussed with Dr. Sorto.





Rito Cantor, PGY-1











<Main Sorto - Last Filed: 10/08/18 08:54>





Results





- Vital Signs


Recent Vital Signs: 





                                Last Vital Signs











Temp  97.9 F   10/08/18 08:17


 


Pulse  63   10/08/18 08:17


 


Resp  18   10/08/18 08:18


 


BP  113/77   10/08/18 08:17


 


Pulse Ox  96   10/08/18 08:17














- Labs


Result Diagrams: 


                                 10/08/18 00:43





                                 10/08/18 00:43


Labs: 





                         Laboratory Results - last 24 hr











  10/08/18 10/08/18 10/08/18





  00:43 00:43 00:43


 


WBC  18.0 H D  


 


RBC  3.14 L  


 


Hgb  9.4 L D  


 


Hct  28.3 L  


 


MCV  90.1  


 


MCH  29.9  


 


MCHC  33.2  


 


RDW  13.0  


 


Plt Count  480 H  


 


MPV  9.4  


 


Gran %  85.3 H  


 


Lymph % (Auto)  7.8 L  


 


Mono % (Auto)  5.7  


 


Eos % (Auto)  0.9 L  


 


Baso % (Auto)  0.3  


 


Gran #  15.36 H  


 


Lymph # (Auto)  1.4  


 


Mono # (Auto)  1.0 H  


 


Eos # (Auto)  0.2  


 


Baso # (Auto)  0.05  


 


PT   13.2 H 


 


INR   1.15 


 


APTT   32.5 


 


Sodium    140


 


Potassium    4.5


 


Chloride    101


 


Carbon Dioxide    30


 


Anion Gap    14


 


BUN    17


 


Creatinine    1.0


 


Est GFR ( Amer)    > 60


 


Est GFR (Non-Af Amer)    > 60


 


Random Glucose    84


 


Calcium    9.0


 


Total Bilirubin    0.6


 


AST    48


 


ALT    39


 


Alkaline Phosphatase    60


 


Total Protein    7.9


 


Albumin    3.7


 


Globulin    4.2


 


Albumin/Globulin Ratio    0.9 L














Attending/Attestation





- Attestation


I have personally seen and examined this patient.: Yes


I have fully participated in the care of the patient.: Yes


I have reviewed all pertinent clinical information: Yes

## 2018-10-09 VITALS — SYSTOLIC BLOOD PRESSURE: 152 MMHG | HEART RATE: 61 BPM | DIASTOLIC BLOOD PRESSURE: 77 MMHG | TEMPERATURE: 98.1 F

## 2018-10-09 LAB
ALBUMIN SERPL-MCNC: 3 G/DL (ref 3–4.8)
ALBUMIN/GLOB SERPL: 0.8 {RATIO} (ref 1.1–1.8)
ALT SERPL-CCNC: 28 U/L (ref 7–56)
AST SERPL-CCNC: 44 U/L (ref 17–59)
BASOPHILS # BLD AUTO: 0.03 K/MM3 (ref 0–2)
BASOPHILS NFR BLD: 0.3 % (ref 0–3)
BUN SERPL-MCNC: 14 MG/DL (ref 7–21)
CALCIUM SERPL-MCNC: 8.6 MG/DL (ref 8.4–10.5)
EOSINOPHIL # BLD: 0.2 10*3/UL (ref 0–0.7)
EOSINOPHIL NFR BLD: 1.9 % (ref 1.5–5)
ERYTHROCYTE [DISTWIDTH] IN BLOOD BY AUTOMATED COUNT: 12.7 % (ref 11.5–14.5)
GFR NON-AFRICAN AMERICAN: > 60
GRANULOCYTES # BLD: 8.52 10*3/UL (ref 1.4–6.5)
GRANULOCYTES NFR BLD: 82.3 % (ref 50–68)
HGB BLD-MCNC: 9.2 G/DL (ref 14–18)
LYMPHOCYTES # BLD: 1 10*3/UL (ref 1.2–3.4)
LYMPHOCYTES NFR BLD AUTO: 9.9 % (ref 22–35)
MCH RBC QN AUTO: 28.8 PG (ref 25–35)
MCHC RBC AUTO-ENTMCNC: 32.6 G/DL (ref 31–37)
MCV RBC AUTO: 88.4 FL (ref 80–105)
MONOCYTES # BLD AUTO: 0.6 10*3/UL (ref 0.1–0.6)
MONOCYTES NFR BLD: 5.6 % (ref 1–6)
PLATELET # BLD: 485 10^3/UL (ref 120–450)
PMV BLD AUTO: 9.6 FL (ref 7–11)
RBC # BLD AUTO: 3.19 10^6/UL (ref 3.5–6.1)
WBC # BLD AUTO: 10.4 10^3/UL (ref 4.5–11)

## 2018-10-09 RX ADMIN — PIPERACILLIN AND TAZOBACTAM SCH: 3; .375 INJECTION, POWDER, LYOPHILIZED, FOR SOLUTION INTRAVENOUS; PARENTERAL at 05:19

## 2018-10-09 RX ADMIN — PIPERACILLIN AND TAZOBACTAM SCH: 3; .375 INJECTION, POWDER, LYOPHILIZED, FOR SOLUTION INTRAVENOUS; PARENTERAL at 13:25

## 2018-10-09 RX ADMIN — VANCOMYCIN HYDROCHLORIDE SCH: 1 INJECTION, POWDER, LYOPHILIZED, FOR SOLUTION INTRAVENOUS at 13:22

## 2018-10-09 NOTE — US
Date of service: 



10/09/2018



PROCEDURE:  Extremity nonvascular ultrasound



HISTORY:

left lower extremity ? abscess vs fluid collection



COMPARISON:

October 8, 2018 left tibia and fibula.



TECHNIQUE:

Standard protocol for this study/examination.



FINDINGS:

Complex fluid collection above the ankle, anatomic area of interest.  

The collection measures 2.4 x 1 x 4.1 cm.  Fluid and debris 

identified within the collection.  Hypervascular rim and skin 

edema/cellulitis. 



IMPRESSION:

Complex fluid collection likely infectious/inflammatory with 

drainable component.

## 2018-10-09 NOTE — CP.PCM.DIS
Provider





- Provider


Date of Admission: 


10/08/18 01:41





Attending physician: 


Romi Allen MD





Primary care physician: 


Paulo Rocha MD





Consults: 





ID: Dr. Diaz


Surgery: Dr. Barreto


Time Spent in preparation of Discharge (in minutes): 45





Hospital Course





- Lab Results


Lab Results: 


                                  Micro Results





10/08/18 00:50   Blood-Venous   Blood Culture - Preliminary


                            NO GROWTH AFTER 24 HOURS


10/08/18 00:29   Blood-Venous   Blood Culture - Preliminary


                            NO GROWTH AFTER 24 HOURS





                             Most Recent Lab Values











WBC  10.4 10^3/ul (4.5-11.0)  D 10/09/18  05:30    


 


RBC  3.19 10^6/uL (3.5-6.1)  L  10/09/18  05:30    


 


Hgb  9.2 g/dL (14.0-18.0)  L  10/09/18  05:30    


 


Hct  28.2 % (42.0-52.0)  L  10/09/18  05:30    


 


MCV  88.4 fl (80.0-105.0)   10/09/18  05:30    


 


MCH  28.8 pg (25.0-35.0)   10/09/18  05:30    


 


MCHC  32.6 g/dl (31.0-37.0)   10/09/18  05:30    


 


RDW  12.7 % (11.5-14.5)   10/09/18  05:30    


 


Plt Count  485 10^3/uL (120.0-450.0)  H  10/09/18  05:30    


 


MPV  9.6 fl (7.0-11.0)   10/09/18  05:30    


 


Gran %  82.3 % (50.0-68.0)  H  10/09/18  05:30    


 


Lymph % (Auto)  9.9 % (22.0-35.0)  L  10/09/18  05:30    


 


Mono % (Auto)  5.6 % (1.0-6.0)   10/09/18  05:30    


 


Eos % (Auto)  1.9 % (1.5-5.0)   10/09/18  05:30    


 


Baso % (Auto)  0.3 % (0.0-3.0)   10/09/18  05:30    


 


Gran #  8.52  (1.4-6.5)  H  10/09/18  05:30    


 


Lymph # (Auto)  1.0  (1.2-3.4)  L  10/09/18  05:30    


 


Mono # (Auto)  0.6  (0.1-0.6)   10/09/18  05:30    


 


Eos # (Auto)  0.2  (0.0-0.7)   10/09/18  05:30    


 


Baso # (Auto)  0.03 K/mm3 (0.0-2.0)   10/09/18  05:30    


 


PT  13.2 SECONDS (9.4-12.5)  H  10/08/18  00:43    


 


INR  1.15   10/08/18  00:43    


 


APTT  32.5 Seconds (25.1-36.5)   10/08/18  00:43    


 


Sodium  136 mmol/L (132-148)   10/09/18  05:30    


 


Potassium  4.3 mmol/L (3.6-5.0)   10/09/18  05:30    


 


Chloride  103 mmol/L ()   10/09/18  05:30    


 


Carbon Dioxide  29 mmol/L (21-33)   10/09/18  05:30    


 


Anion Gap  9  (10-20)  L  10/09/18  05:30    


 


BUN  14 mg/dL (7-21)   10/09/18  05:30    


 


Creatinine  0.9 mg/dl (0.8-1.5)   10/09/18  05:30    


 


Est GFR ( Amer)  > 60   10/09/18  05:30    


 


Est GFR (Non-Af Amer)  > 60   10/09/18  05:30    


 


Random Glucose  90 mg/dL ()   10/09/18  05:30    


 


Calcium  8.6 mg/dL (8.4-10.5)   10/09/18  05:30    


 


Total Bilirubin  0.3 mg/dL (0.2-1.3)   10/09/18  05:30    


 


AST  44 U/L (17-59)   10/09/18  05:30    


 


ALT  28 U/L (7-56)   10/09/18  05:30    


 


Alkaline Phosphatase  51 U/L ()   10/09/18  05:30    


 


Total Protein  6.9 g/dL (5.8-8.3)   10/09/18  05:30    


 


Albumin  3.0 g/dL (3.0-4.8)   10/09/18  05:30    


 


Globulin  3.9 gm/dL  10/09/18  05:30    


 


Albumin/Globulin Ratio  0.8  (1.1-1.8)  L  10/09/18  05:30    














- Hospital Course


Hospital Course: 





Mr. Mendoza is a 48 M with PMHx of HSV infection, GERD, depression, and substance

abuse who presented with worsening LLE pain. Patient complained of pain in left 

leg that inhibits his ability to walk. Patient reported this pain began a few 

weeks ago, but ignored it under the pretense that it would improve on its own. 

Patient reported open ulcers in the past, but never painful to this extent. 

Patient denies recent IVDU and manually trying to drain the wound. Patient 

denies taking anything for the pain and reports resting as the only form of 

relief. Patient described the pain as sharp and radiates down into his left 

foot. Patient denied suicidal and homicidal ideations at thetime.





Pt was admitted for cellulitis/abscess of the LLE. Pt was intially treated with 

IV vancomycin. Throughout hospital stay, per nursing staff and interviews, 

patient had been denying treatment with IV fluids, hep block and IV antibiotics.

Pt was not responsive to interview questions from several members of ID team, 

Surgery and Medicine team. Pt was last given a dose of IV vancomycin on 9/8/18. 

Pt was advised numerous times by several physicians from ID team, Surgery team 

and Medicine team throughout the hospital course that the best course of action 

to treat his abscess was I&D of the abscess of his LLE. Pt had refused the 

procedure multiple times. Pt was scheduled for OR for I&D as he had agreed at 

one point to undergo procedure, however he refused bloodwork for type and cross.

On 9/9/18, I was paged by nursing staff and informed that patient was requesting

to leave the hospital against medical advice. I immediately responded to the 

page and went to evaluate the patient. Upon arrival to patient's room, patient 

was no longer present. Nursing staff notified me that patient had left and 

refused medical evaluation. Pt informed the nursing staff that he didn't want to

wait for resident before leaving. Explanation of risks, benefits, alternatives 

to treatment and medical examination were not able to be performed as patient 

had eloped. Instructions for follow-up instructions were not able to be given as

patient had left against medical advice. 


 





Discharge Plan





- Follow Up Plan


Condition: STABLE


Disposition: HOME/ ROUTINE


Instructions:  Cellulitis (ED)


Referrals: 


Paulo Rocha MD [Primary Care Provider] - 





Addendum


Addendum: 





10/10/18 17:18


Physical exam was not able to be performed as patient had eloped

## 2018-10-09 NOTE — CP.PCM.PN
Subjective





- Date & Time of Evaluation


Date of Evaluation: 10/09/18


Time of Evaluation: 17:45





- Subjective


Subjective: 





INTERNAL MEDICINE PROGRESS NOTE FOR DR. SABINA Spangler D.O. PGY-1





Resident paged from nursing staff in regards to patient requesting to sign out 

AMA. Upon arrival to patient's room, patient was not present.  Nursing staff 

notified me that patient had left immediately and refused medical evaluation. Pt

informed the nursing staff that he didn't want to wait for resident before 

leaving. Explanation of risks, benefits, alternatives to treatment and medical 

examination were not able to be performed as patient had eloped.  





Objective





- Vital Signs/Intake and Output


Vital Signs (last 24 hours): 


                                        











Temp Pulse Resp BP Pulse Ox


 


 98.1 F   61   19   152/77 H  100 


 


 10/09/18 08:40  10/09/18 08:40  10/09/18 08:40  10/09/18 08:40  10/09/18 08:40











- Labs


Labs: 


                                        





                                 10/09/18 05:30 





                                 10/09/18 05:30 





                                        











PT  13.2 SECONDS (9.4-12.5)  H  10/08/18  00:43    


 


INR  1.15   10/08/18  00:43    


 


APTT  32.5 Seconds (25.1-36.5)   10/08/18  00:43

## 2018-10-09 NOTE — CP.PCM.PN
Subjective





- Date & Time of Evaluation


Date of Evaluation: 10/09/18


Time of Evaluation: 09:24





- Subjective


Subjective: 


General Surgery progress note for Dr. Barreto





Patient seen and examined at bedside. No acute events overnight. Patient is 

still refusing bedside incision and drainage procedure. Risks of lack of 

intervention were explained to the patient. Patient denies fever. Patient 

refused to answer further questioning.








Objective





- Vital Signs/Intake and Output


Vital Signs (last 24 hours): 


                                        











Temp Pulse Resp BP Pulse Ox


 


 98.1 F   61   19   152/77 H  100 


 


 10/09/18 08:40  10/09/18 08:40  10/09/18 08:40  10/09/18 08:40  10/09/18 08:40











- Medications


Medications: 


                               Current Medications





Dextrose/Sodium Chloride (Dextrose 5%/0.9% Ns 1000 Ml)  1,000 mls @ 100 mls/hr 

IV .Q10H KUSUM


   Last Admin: 10/08/18 22:01 Dose:  Not Given


Vancomycin HCl (Vancomycin 1gm)  1 gm in 250 mls @ 167 mls/hr IVPB Q12 KUSUM; 

Protocol


   Last Admin: 10/08/18 21:59 Dose:  Not Given


Piperacillin Sod/Tazobactam Sod (Zosyn 3.375 In Ns 100ml)  100 mls @ 25 mls/hr 

IVPB Q8 KUSUM; Protocol


   Stop: 10/15/18 14:01


   Last Admin: 10/09/18 05:19 Dose:  Not Given


Ibuprofen (Motrin Tab)  600 mg PO Q6H PRN


   PRN Reason: Pain, moderate (4-7)











- Labs


Labs: 


                                        





                                 10/09/18 05:30 





                                 10/09/18 05:30 





                                        











PT  13.2 SECONDS (9.4-12.5)  H  10/08/18  00:43    


 


INR  1.15   10/08/18  00:43    


 


APTT  32.5 Seconds (25.1-36.5)   10/08/18  00:43    














- Constitutional


Appears: Well, Non-toxic, No Acute Distress





- Head Exam


Head Exam: ATRAUMATIC, NORMOCEPHALIC





- Eye Exam


Eye Exam: Normal appearance





- Respiratory Exam


Respiratory Exam: NORMAL BREATHING PATTERN.  absent: Respiratory Distress





- Cardiovascular Exam


Cardiovascular Exam: RRR.  absent: Tachycardia





- GI/Abdominal Exam


GI & Abdominal Exam: Soft.  absent: Guarding, Tenderness





- Extremities Exam


Additional comments: 


fluctuant skin along anterior aspect of left lower extremity 








- Neurological Exam


Neurological Exam: Alert, Awake, Oriented x3





- Psychiatric Exam


Psychiatric exam: Normal Affect, Normal Mood





- Skin


Skin Exam: Dry, Intact, Normal Color, Warm





Assessment and Plan





- Assessment and Plan (Free Text)


Assessment: 


Patient is a 48 year old male with left anterior lower leg abscess.





Plan: 


- Patient refusing surgical intervention at this present time, patient being 

extremely uncooperative


- Continue antibiotics as per ID 


- Continue to apply warm compresses to affected area 





Case discussed with Dr. Estevan Mcgrath DO PGY-1

## 2018-10-09 NOTE — CP.PCM.PN
<Cherelle Aguilar - Last Filed: 10/09/18 14:03>





Subjective





- Date & Time of Evaluation


Date of Evaluation: 10/09/18


Time of Evaluation: 11:10





- Subjective


Subjective: 


PGY-3 ID progress note for Dr. Dyer 





As per nurse, patient refused IV antibiotics last night, refused warm compress. 

Patient is extremely non cooperative, and refused to answer any questions. 





Objective





- Vital Signs/Intake and Output


Vital Signs (last 24 hours): 


                                        











Temp Pulse Resp BP Pulse Ox


 


 98.1 F   61   19   152/77 H  100 


 


 10/09/18 08:40  10/09/18 08:40  10/09/18 08:40  10/09/18 08:40  10/09/18 08:40











- Medications


Medications: 


                               Current Medications





Vancomycin HCl (Vancomycin 1gm)  1 gm in 250 mls @ 167 mls/hr IVPB Q12 KUSUM; 

Protocol


   Last Admin: 10/08/18 21:59 Dose:  Not Given


Piperacillin Sod/Tazobactam Sod (Zosyn 3.375 In Ns 100ml)  100 mls @ 25 mls/hr 

IVPB Q8 KUS[facility]; Protocol


   Stop: 10/15/18 14:01


   Last Admin: 10/09/18 05:19 Dose:  Not Given


Lactated Ringer's (Lactated Ringer's)  1,000 mls @ 100 mls/hr IV .Q10H KUSUM


Ibuprofen (Motrin Tab)  600 mg PO Q6H PRN


   PRN Reason: Pain, moderate (4-7)











- Labs


Labs: 


                                        





                                 10/09/18 05:30 





                                 10/09/18 05:30 





                                        











PT  13.2 SECONDS (9.4-12.5)  H  10/08/18  00:43    


 


INR  1.15   10/08/18  00:43    


 


APTT  32.5 Seconds (25.1-36.5)   10/08/18  00:43    














- Constitutional


Appears: No Acute Distress, Unkempt





- Head Exam


Head Exam: NORMAL INSPECTION





- Skin


Additional comments: 


Focused exam: 


Left leg: dorsal aspect with large tense, abscess like collection, with 

surrounding edema and erythema, hot to touch. 





Assessment and Plan





- Assessment and Plan (Free Text)


Assessment: 


Patient is a 49 y/o with: 


Cellulites and abscess of the left lower extremities 


h/o HSV infection


GERD 


depression, 


polysubstance abuse


Plan: 


Leukocytosis resolved, no fever, hemodynamicaly stable. Blood cultures with no 

growth. Patient will need to have I&D of the abscess. 


Continue with zosyn and vanco day#2 pending. Will continue to follow. 





Patient seen, examined and case discussed with Dr. Dyer.





<Andrae Dyer - Last Filed: 10/09/18 21:19>





Objective





- Vital Signs/Intake and Output


Vital Signs (last 24 hours): 


                                        











Temp Pulse Resp BP Pulse Ox


 


 98.1 F   61   19   152/77 H  100 


 


 10/09/18 08:40  10/09/18 08:40  10/09/18 08:40  10/09/18 08:40  10/09/18 08:40











- Labs


Labs: 


                                        





                                 10/09/18 05:30 





                                 10/09/18 05:30 





                                        











PT  13.2 SECONDS (9.4-12.5)  H  10/08/18  00:43    


 


INR  1.15   10/08/18  00:43    


 


APTT  32.5 Seconds (25.1-36.5)   10/08/18  00:43    














Assessment and Plan





- Assessment and Plan (Free Text)


Plan: 





Infectious Diseases Attending Physician Addendum


Patient seen, examined, discussed with medical resident. I have reviewed the 

pertinent clinical information. I agree with the above findings, assessment and 

plan and in addition, continue Vancomycin and Zosyn for left leg skin and skin 

structure infection with possible abscess. Patient needs I and D, but patient is

refusing - we strongly recommend I and D and follow up abscess cultures.HIV 

status was negative in July 2018.